# Patient Record
Sex: MALE | Race: BLACK OR AFRICAN AMERICAN | NOT HISPANIC OR LATINO | Employment: OTHER | URBAN - METROPOLITAN AREA
[De-identification: names, ages, dates, MRNs, and addresses within clinical notes are randomized per-mention and may not be internally consistent; named-entity substitution may affect disease eponyms.]

---

## 2018-11-20 ENCOUNTER — OFFICE VISIT (OUTPATIENT)
Dept: OPHTHALMOLOGY | Facility: CLINIC | Age: 68
End: 2018-11-20
Payer: MEDICARE

## 2018-11-20 DIAGNOSIS — H40.003 GLAUCOMA SUSPECT, BILATERAL: Primary | ICD-10-CM

## 2018-11-20 PROCEDURE — 92004 COMPRE OPH EXAM NEW PT 1/>: CPT | Mod: S$PBB,,, | Performed by: OPHTHALMOLOGY

## 2018-11-20 PROCEDURE — 99999 PR PBB SHADOW E&M-NEW PATIENT-LVL II: CPT | Mod: PBBFAC,,, | Performed by: OPHTHALMOLOGY

## 2018-11-20 PROCEDURE — 99202 OFFICE O/P NEW SF 15 MIN: CPT | Mod: PBBFAC,PO | Performed by: OPHTHALMOLOGY

## 2018-11-20 RX ORDER — LISINOPRIL 20 MG/1
20 TABLET ORAL DAILY
COMMUNITY

## 2018-11-20 RX ORDER — GLIPIZIDE 10 MG/1
5 TABLET, FILM COATED, EXTENDED RELEASE ORAL
COMMUNITY

## 2018-11-20 RX ORDER — ASPIRIN 81 MG/1
81 TABLET ORAL DAILY
COMMUNITY

## 2018-11-20 RX ORDER — METFORMIN HYDROCHLORIDE 1000 MG/1
1000 TABLET ORAL 2 TIMES DAILY WITH MEALS
COMMUNITY

## 2018-11-20 NOTE — PROGRESS NOTES
===============================  11/20/2018   Azalea Acosta,   68 y.o. male   Last visit [facility]: :Visit date not found   Last visit eye dept. Visit date not found  VA:  Uncorrected distance visual acuity was 20/25 -2 in the right eye and 20/25 +2 in the left eye.  Tonometry     Tonometry (Applanation, 2:01 PM)       Right Left    Pressure 26 25              Wearing Rx     Wearing Rx       Sphere    Right +1.50    Left +1.50    Type:  OTC readersl              Manifest Refraction     Manifest Refraction       Sphere Cylinder Dist VA    Right [place] Sphere 20/25-1    Left [place] Sphere 20/20              Chief Complaint   Patient presents with    Diabetic Eye Exam        HPI     Diabetic Eye exam  New Patient  Problem with distance VA worse at night  Wears +1.50 readers  No pain or discomfort  Ref: Olga Hernandez    DM 2003    Last edited by Mariana Bennett on 11/20/2018  1:47 PM. (History)          ________________  11/20/2018  Problem List Items Addressed This Visit     None        T 25 27  Cd 0.6 0.5  - fam hx no prev hx   gonio ok    do cct rnfl    rtc   iop chenck and VF       ===========================

## 2018-12-21 ENCOUNTER — OFFICE VISIT (OUTPATIENT)
Dept: OPHTHALMOLOGY | Facility: CLINIC | Age: 68
End: 2018-12-21
Payer: MEDICARE

## 2018-12-21 ENCOUNTER — APPOINTMENT (OUTPATIENT)
Dept: OPHTHALMOLOGY | Facility: CLINIC | Age: 68
End: 2018-12-21
Payer: MEDICARE

## 2018-12-21 DIAGNOSIS — H40.003 GLAUCOMA SUSPECT, BOTH EYES: Primary | ICD-10-CM

## 2018-12-21 PROCEDURE — 99213 OFFICE O/P EST LOW 20 MIN: CPT | Mod: S$PBB,,, | Performed by: OPHTHALMOLOGY

## 2018-12-21 PROCEDURE — 92083 EXTENDED VISUAL FIELD XM: CPT | Mod: PBBFAC,PO | Performed by: OPHTHALMOLOGY

## 2018-12-21 PROCEDURE — 99999 PR PBB SHADOW E&M-EST. PATIENT-LVL II: CPT | Mod: PBBFAC,,, | Performed by: OPHTHALMOLOGY

## 2018-12-21 PROCEDURE — 99212 OFFICE O/P EST SF 10 MIN: CPT | Mod: PBBFAC,PO,25 | Performed by: OPHTHALMOLOGY

## 2018-12-21 NOTE — PROGRESS NOTES
===============================  12/21/2018   Azalea Acosta,   68 y.o. male   Last visit [facility]: :11/20/2018   Last visit eye dept. 11/20/2018  VA:  Visual acuity was not recorded.   Not recorded         Not recorded         Not recorded        Chief Complaint   Patient presents with    s coag     here for review of diagnostics and iop ck        HPI     s coag      Additional comments: here for review of diagnostics and iop ck              Comments     DM 2003  No dbr  Suspect coag  Tmax 25/27  Cct 506/514  C/d 0.6/0.55  VF 12/21/18          Last edited by EDER Jeter on 12/21/2018  1:41 PM. (History)          ________________  12/21/2018  Problem List Items Addressed This Visit     None        Vf   od sup lid cut  Os incr bs   T 25  27  cct +3  Do sdp optos  rtc  1mo iop liekly  - xalatan      .       ===========================

## 2019-01-28 ENCOUNTER — OFFICE VISIT (OUTPATIENT)
Dept: OPHTHALMOLOGY | Facility: CLINIC | Age: 69
End: 2019-01-28
Payer: MEDICARE

## 2019-01-28 DIAGNOSIS — H40.1131 PRIMARY OPEN ANGLE GLAUCOMA (POAG) OF BOTH EYES, MILD STAGE: Primary | ICD-10-CM

## 2019-01-28 PROCEDURE — 92250 FUNDUS PHOTOGRAPHY W/I&R: CPT | Mod: PBBFAC,PN | Performed by: OPHTHALMOLOGY

## 2019-01-28 PROCEDURE — 99999 PR PBB SHADOW E&M-EST. PATIENT-LVL II: ICD-10-PCS | Mod: PBBFAC,,, | Performed by: OPHTHALMOLOGY

## 2019-01-28 PROCEDURE — 99213 PR OFFICE/OUTPT VISIT, EST, LEVL III, 20-29 MIN: ICD-10-PCS | Mod: S$PBB,,, | Performed by: OPHTHALMOLOGY

## 2019-01-28 PROCEDURE — 92250 COLOR FUNDUS PHOTOGRAPHY - OU - BOTH EYES: ICD-10-PCS | Mod: 26,S$PBB,, | Performed by: OPHTHALMOLOGY

## 2019-01-28 PROCEDURE — 99213 OFFICE O/P EST LOW 20 MIN: CPT | Mod: S$PBB,,, | Performed by: OPHTHALMOLOGY

## 2019-01-28 PROCEDURE — 99212 OFFICE O/P EST SF 10 MIN: CPT | Mod: PBBFAC,PN,25 | Performed by: OPHTHALMOLOGY

## 2019-01-28 PROCEDURE — 99999 PR PBB SHADOW E&M-EST. PATIENT-LVL II: CPT | Mod: PBBFAC,,, | Performed by: OPHTHALMOLOGY

## 2019-01-28 RX ORDER — LATANOPROST 50 UG/ML
1 SOLUTION/ DROPS OPHTHALMIC NIGHTLY
Qty: 1 BOTTLE | Refills: 4 | Status: SHIPPED | OUTPATIENT
Start: 2019-01-28 | End: 2019-07-11 | Stop reason: SDUPTHER

## 2019-01-28 NOTE — PROGRESS NOTES
===============================  01/28/2019   Azalea Acosta,   68 y.o. male   Last visit [facility]: :12/21/2018   Last visit eye dept. Visit date not found  VA:  Uncorrected distance visual acuity was 20/25 -1 in the right eye and 20/25 -1 in the left eye.  Tonometry     Tonometry (Applanation, 1:54 PM)       Right Left    Pressure 24 24               Not recorded         Not recorded        Chief Complaint   Patient presents with    Glaucoma Suspect     1 month IOP check, likely start Xalatan this visit. pt states no changes since his last visit. not using any drops. no pain in the eyes. vision is about the same.     Ophthalmic Medications     Ophthalmic-Intraocular Pressure Reducing Agents, Prostaglandin Analogs Start End    latanoprost 0.005 % ophthalmic solution 1/28/2019 1/28/2020    Sig: Place 1 drop into both eyes every evening.    Route: Both Eyes         HPI     Glaucoma Suspect      Additional comments: 1 month IOP check, likely start Xalatan this visit.   pt states no changes since his last visit. not using any drops. no pain in   the eyes. vision is about the same.              Comments     DM 2003  No dbr  Suspect coag  Tmax 25/27  Cct 506/514  C/d 0.6/0.55  VF 12/21/18          Last edited by Mayank Dallas on 1/28/2019  1:28 PM. (History)          ________________  1/28/2019  Problem List Items Addressed This Visit     None      Visit Diagnoses     Primary open angle glaucoma (POAG) of both eyes, mild stage    -  Primary    Relevant Orders    Color Fundus Photography - OU - Both Eyes (Completed)        iop 24 ou  Start latanoprost ou qhs  rtc 2 mos  .       ===========================

## 2019-03-25 ENCOUNTER — OFFICE VISIT (OUTPATIENT)
Dept: OPHTHALMOLOGY | Facility: CLINIC | Age: 69
End: 2019-03-25
Payer: MEDICARE

## 2019-03-25 DIAGNOSIS — H40.1131 PRIMARY OPEN ANGLE GLAUCOMA (POAG) OF BOTH EYES, MILD STAGE: Primary | ICD-10-CM

## 2019-03-25 PROCEDURE — 92012 PR EYE EXAM, EST PATIENT,INTERMED: ICD-10-PCS | Mod: S$PBB,,, | Performed by: OPHTHALMOLOGY

## 2019-03-25 PROCEDURE — 99999 PR PBB SHADOW E&M-EST. PATIENT-LVL II: ICD-10-PCS | Mod: PBBFAC,,, | Performed by: OPHTHALMOLOGY

## 2019-03-25 PROCEDURE — 99999 PR PBB SHADOW E&M-EST. PATIENT-LVL II: CPT | Mod: PBBFAC,,, | Performed by: OPHTHALMOLOGY

## 2019-03-25 PROCEDURE — 92012 INTRM OPH EXAM EST PATIENT: CPT | Mod: S$PBB,,, | Performed by: OPHTHALMOLOGY

## 2019-03-25 PROCEDURE — 99212 OFFICE O/P EST SF 10 MIN: CPT | Mod: PBBFAC,PN | Performed by: OPHTHALMOLOGY

## 2019-03-25 NOTE — PROGRESS NOTES
===============================  03/25/2019   Azalea Acosta,   69 y.o. male   Last visit [facility]: :1/28/2019   Last visit eye dept. 1/28/2019  VA:  Uncorrected distance visual acuity was 20/25 -1 in the right eye and 20/25 -2 in the left eye.  Tonometry     Tonometry (Applanation, 1:22 PM)       Right Left    Pressure 25 21               Not recorded         Not recorded        Chief Complaint   Patient presents with    Glaucoma     2 month IOP check, started Latanoprost last visit. pt states he's using his drops as directed. no ocular pain or irritation. vision is about the same.      Ophthalmic Medications     Ophthalmic-Intraocular Pressure Reducing Agents, Prostaglandin Analogs Start End     latanoprost 0.005 % ophthalmic solution    1/28/2019 1/28/2020    Sig: Place 1 drop into both eyes every evening.    Route: Both Eyes         HPI     Glaucoma      Additional comments: 2 month IOP check, started Latanoprost last visit.   pt states he's using his drops as directed. no ocular pain or irritation.   vision is about the same.               Comments     DM 2003  No dbr  Suspect coag  Tmax 25/27  Cct 506/514  C/d 0.6/0.55  VF 12/21/18    Latanoprost QHS OU          Last edited by Mayank Dallas on 3/25/2019  1:13 PM. (History)          ________________  3/25/2019  Problem List Items Addressed This Visit        Eye/Vision problems    Primary open angle glaucoma (POAG) of both eyes, mild stage - Primary          .iop not well controlled on xalatan  Pt. C/o cost of xalatan  Discussed SLT  rtc to eval with Dr. Irvin or Dr. Smith       ===========================

## 2019-04-04 ENCOUNTER — OFFICE VISIT (OUTPATIENT)
Dept: OPHTHALMOLOGY | Facility: CLINIC | Age: 69
End: 2019-04-04
Payer: MEDICARE

## 2019-04-04 DIAGNOSIS — H40.1131 PRIMARY OPEN ANGLE GLAUCOMA (POAG) OF BOTH EYES, MILD STAGE: Primary | ICD-10-CM

## 2019-04-04 PROCEDURE — 92012 INTRM OPH EXAM EST PATIENT: CPT | Mod: S$PBB,,, | Performed by: OPHTHALMOLOGY

## 2019-04-04 PROCEDURE — 99212 OFFICE O/P EST SF 10 MIN: CPT | Mod: PBBFAC,PN | Performed by: OPHTHALMOLOGY

## 2019-04-04 PROCEDURE — 99999 PR PBB SHADOW E&M-EST. PATIENT-LVL II: CPT | Mod: PBBFAC,,, | Performed by: OPHTHALMOLOGY

## 2019-04-04 PROCEDURE — 99999 PR PBB SHADOW E&M-EST. PATIENT-LVL II: ICD-10-PCS | Mod: PBBFAC,,, | Performed by: OPHTHALMOLOGY

## 2019-04-04 PROCEDURE — 92012 PR EYE EXAM, EST PATIENT,INTERMED: ICD-10-PCS | Mod: S$PBB,,, | Performed by: OPHTHALMOLOGY

## 2019-04-04 RX ORDER — KETOROLAC TROMETHAMINE 5 MG/ML
SOLUTION OPHTHALMIC
Qty: 1 BOTTLE | Refills: 0 | Status: SHIPPED | OUTPATIENT
Start: 2019-04-04 | End: 2019-05-20

## 2019-04-04 NOTE — PROGRESS NOTES
SUBJECTIVE:   Azalea Acosta is a 69 y.o. male   Uncorrected distance visual acuity was 20/20 -2 in the right eye and 20/20 -1 in the left eye.   Chief Complaint   Patient presents with    Glaucoma        HPI:  HPI     SLT/Glaucoma Eval ref from [facility]  No pain or discomfort  VA stable OU  100% compliant with Latanoprost    DM 2003  No dbr  Suspect coag  Tmax 25/27  Cct 506/514  C/d 0.6/0.55  VF 12/21/18    Latanoprost QHS OU    Last edited by Mariana Bennett on 4/4/2019  1:29 PM. (History)        Assessment /Plan :  1. Primary open angle glaucoma (POAG) of both eyes, mild stage IOP not within acceptable range relative to target IOP with risk of irreversible visual loss. Better IOP control is recommended. Discussed options, risks, and benefits of additional medication, SLT laser, and/or incisional glaucoma surgery. Reviewed importance of continued compliance with treatment and follow up.     Patient chooses schedule SLT  OD

## 2019-04-09 ENCOUNTER — TELEPHONE (OUTPATIENT)
Dept: OPHTHALMOLOGY | Facility: CLINIC | Age: 69
End: 2019-04-09

## 2019-04-09 NOTE — TELEPHONE ENCOUNTER
----- Message from Gilberto Stevenson sent at 4/9/2019  1:39 PM CDT -----  Contact: [phone]    Pt would like instructions on how to take eye medication before surgery.  Please call back at [phone].  x-

## 2019-05-20 ENCOUNTER — OFFICE VISIT (OUTPATIENT)
Dept: OPHTHALMOLOGY | Facility: CLINIC | Age: 69
End: 2019-05-20
Payer: MEDICARE

## 2019-05-20 DIAGNOSIS — H40.1131 PRIMARY OPEN ANGLE GLAUCOMA (POAG) OF BOTH EYES, MILD STAGE: Primary | ICD-10-CM

## 2019-05-20 PROCEDURE — 92012 INTRM OPH EXAM EST PATIENT: CPT | Mod: S$PBB,,, | Performed by: OPHTHALMOLOGY

## 2019-05-20 PROCEDURE — 99999 PR PBB SHADOW E&M-EST. PATIENT-LVL II: CPT | Mod: PBBFAC,,, | Performed by: OPHTHALMOLOGY

## 2019-05-20 PROCEDURE — 99999 PR PBB SHADOW E&M-EST. PATIENT-LVL II: ICD-10-PCS | Mod: PBBFAC,,, | Performed by: OPHTHALMOLOGY

## 2019-05-20 PROCEDURE — 99212 OFFICE O/P EST SF 10 MIN: CPT | Mod: PBBFAC | Performed by: OPHTHALMOLOGY

## 2019-05-20 PROCEDURE — 92012 PR EYE EXAM, EST PATIENT,INTERMED: ICD-10-PCS | Mod: S$PBB,,, | Performed by: OPHTHALMOLOGY

## 2019-05-20 RX ORDER — KETOROLAC TROMETHAMINE 5 MG/ML
SOLUTION OPHTHALMIC
Qty: 1 BOTTLE | Refills: 0 | Status: SHIPPED | OUTPATIENT
Start: 2019-05-20 | End: 2020-07-31

## 2019-05-20 NOTE — PROGRESS NOTES
SUBJECTIVE:   Azalea Acosta is a 69 y.o. male   Uncorrected distance visual acuity was 20/20 -1 in the right eye and 20/20 -2 in the left eye.   Chief Complaint   Patient presents with    Glaucoma     Latanoprost OU qhs        HPI:  HPI     Glaucoma      Additional comments: Latanoprost OU qhs              Comments     5 to 6 weeks post SLT OD (IOP check)    No eye pain and no eye changes since the laser  Patient states that the Latanoprost costs $50.    1. DM 2003 No dbr  2. Mild COAG (Tmax 25/27 ) (+fam hx)  Cct 506/514  Slt od 4-10-19 (24-)    Latanoprost QHS OU          Last edited by Becca Dailey on 5/20/2019  1:25 PM. (History)        Assessment /Plan :  1. Primary open angle glaucoma (POAG) of both eyes, mild stage   Nice response to the SLT OD  Doing well, IOP OD within acceptable range relative to target IOP and no evidence of progression. Continue current treatment. Reviewed importance of continued compliance with treatment and follow up.    IOP OS not within acceptable range relative to target IOP with risk of irreversible visual loss. Better IOP control is recommended. Discussed options, risks, and benefits of additional medication, SLT laser, and/or incisional glaucoma surgery. Reviewed importance of continued compliance with treatment and follow up.     Patient chooses schedule SLT  OS           Return for the SLT OS

## 2019-07-11 ENCOUNTER — OFFICE VISIT (OUTPATIENT)
Dept: OPHTHALMOLOGY | Facility: CLINIC | Age: 69
End: 2019-07-11
Payer: MEDICARE

## 2019-07-11 DIAGNOSIS — H40.1131 PRIMARY OPEN ANGLE GLAUCOMA (POAG) OF BOTH EYES, MILD STAGE: Primary | ICD-10-CM

## 2019-07-11 PROCEDURE — 92012 PR EYE EXAM, EST PATIENT,INTERMED: ICD-10-PCS | Mod: S$PBB,,, | Performed by: OPHTHALMOLOGY

## 2019-07-11 PROCEDURE — 92012 INTRM OPH EXAM EST PATIENT: CPT | Mod: S$PBB,,, | Performed by: OPHTHALMOLOGY

## 2019-07-11 PROCEDURE — 99999 PR PBB SHADOW E&M-EST. PATIENT-LVL II: ICD-10-PCS | Mod: PBBFAC,,, | Performed by: OPHTHALMOLOGY

## 2019-07-11 PROCEDURE — 99999 PR PBB SHADOW E&M-EST. PATIENT-LVL II: CPT | Mod: PBBFAC,,, | Performed by: OPHTHALMOLOGY

## 2019-07-11 PROCEDURE — 99212 OFFICE O/P EST SF 10 MIN: CPT | Mod: PBBFAC | Performed by: OPHTHALMOLOGY

## 2019-07-11 RX ORDER — LATANOPROST 50 UG/ML
1 SOLUTION/ DROPS OPHTHALMIC NIGHTLY
Qty: 1 BOTTLE | Refills: 12 | Status: SHIPPED | OUTPATIENT
Start: 2019-07-11 | End: 2020-08-24

## 2019-07-11 NOTE — PROGRESS NOTES
SUBJECTIVE:   Azalea Acosta is a 69 y.o. male   Uncorrected distance visual acuity was 20/20 in the right eye and 20/20 in the left eye.   Chief Complaint   Patient presents with    Glaucoma     5-6 Weeks SLT OS        HPI:  HPI     Glaucoma      Additional comments: 5-6 Weeks SLT OS              Comments     Patient states no visual complaints & no discomfort. 100% drop compliance       1. DM 2003 No dbr  2. Mild COAG (Tmax 25/27 ) (+fam hx)  Cct 506/514  Slt od 4-10-19 (24-21)  SLT OS 6-5-19    Latanoprost QHS OU          Last edited by Devora Patton on 7/11/2019  1:40 PM. (History)        Assessment /Plan :  1. Primary open angle glaucoma (POAG) of both eyes, mild stage S/P SLT OS 22-20    IOP elevated to to pt missing Latanoprost last night instructed pt to resume          Return to clinic in 3 months  or as needed.  With Dilation, HVF 24-2 and SDP

## 2019-10-24 ENCOUNTER — OFFICE VISIT (OUTPATIENT)
Dept: OPHTHALMOLOGY | Facility: CLINIC | Age: 69
End: 2019-10-24
Payer: MEDICARE

## 2019-10-24 ENCOUNTER — APPOINTMENT (OUTPATIENT)
Dept: OPHTHALMOLOGY | Facility: CLINIC | Age: 69
End: 2019-10-24
Payer: MEDICARE

## 2019-10-24 DIAGNOSIS — H25.13 NUCLEAR SCLEROSIS OF BOTH EYES: ICD-10-CM

## 2019-10-24 DIAGNOSIS — H40.1131 PRIMARY OPEN ANGLE GLAUCOMA (POAG) OF BOTH EYES, MILD STAGE: Primary | ICD-10-CM

## 2019-10-24 DIAGNOSIS — H26.9 CORTICAL CATARACT OF BOTH EYES: ICD-10-CM

## 2019-10-24 DIAGNOSIS — E11.9 DIABETES MELLITUS TYPE 2 WITHOUT RETINOPATHY: ICD-10-CM

## 2019-10-24 PROCEDURE — 92083 HUMPHREY VISUAL FIELD - OU - BOTH EYES: ICD-10-PCS | Mod: 26,S$PBB,, | Performed by: OPHTHALMOLOGY

## 2019-10-24 PROCEDURE — 92250 COLOR FUNDUS PHOTOGRAPHY - OU - BOTH EYES: ICD-10-PCS | Mod: 26,S$PBB,, | Performed by: OPHTHALMOLOGY

## 2019-10-24 PROCEDURE — 99999 PR PBB SHADOW E&M-EST. PATIENT-LVL II: CPT | Mod: PBBFAC,,, | Performed by: OPHTHALMOLOGY

## 2019-10-24 PROCEDURE — 92250 FUNDUS PHOTOGRAPHY W/I&R: CPT | Mod: PBBFAC | Performed by: OPHTHALMOLOGY

## 2019-10-24 PROCEDURE — 92014 COMPRE OPH EXAM EST PT 1/>: CPT | Mod: S$PBB,,, | Performed by: OPHTHALMOLOGY

## 2019-10-24 PROCEDURE — 99212 OFFICE O/P EST SF 10 MIN: CPT | Mod: PBBFAC,25 | Performed by: OPHTHALMOLOGY

## 2019-10-24 PROCEDURE — 92014 PR EYE EXAM, EST PATIENT,COMPREHESV: ICD-10-PCS | Mod: S$PBB,,, | Performed by: OPHTHALMOLOGY

## 2019-10-24 PROCEDURE — 92083 EXTENDED VISUAL FIELD XM: CPT | Mod: PBBFAC | Performed by: OPHTHALMOLOGY

## 2019-10-24 PROCEDURE — 99999 PR PBB SHADOW E&M-EST. PATIENT-LVL II: ICD-10-PCS | Mod: PBBFAC,,, | Performed by: OPHTHALMOLOGY

## 2019-10-24 NOTE — PROGRESS NOTES
SUBJECTIVE:   Azalea Acosta is a 69 y.o. male   Uncorrected distance visual acuity was 20/20 -2 in the right eye and 20/25 in the left eye.   Chief Complaint   Patient presents with    Glaucoma     3M HVF, DOA, and SDP        HPI:  HPI     Glaucoma      Additional comments: 3M HVF, DOA, and SDP              Comments     The patient states his eyes are doing well and he denies any ocular pain   at this time.  100% drop compliance    1. DM 2003 No dbr  2. Mild COAG (Tmax 25/27 ) (+fam hx)  Cct 506/514  Slt od 4-10-19 (24-21)  SLT OS 6-5-19    Latanoprost QHS OU          Last edited by Yuki Corbett on 10/24/2019  1:04 PM. (History)        Assessment /Plan :  1. Primary open angle glaucoma (POAG) of both eyes, mild stage   IOP OD not within acceptable range relative to target IOP with risk of irreversible visual loss. Better IOP control is recommended. Discussed options, risks, and benefits of additional medication, SLT laser, and/or incisional glaucoma surgery. Reviewed importance of continued compliance with treatment and follow up.     Patient chooses defer and recheck IOP next visit     Doing well, IOP OS within acceptable range relative to target IOP and no evidence of progression. Continue current treatment. Reviewed importance of continued compliance with treatment and follow up.       2. Nuclear sclerosis of both eyes monitor for now   3. Cortical cataract of both eyes monitor for now   4.      Diabetes Mellitus  No diabetic retinopathy at this time. Reviewed diabetic eye precautions including avoiding tobacco use,            Good glucose control, and importance of regular follow up.     Return to clinic in 3-4 months  or as needed.  With IOP Check and GOCT

## 2020-01-24 ENCOUNTER — OFFICE VISIT (OUTPATIENT)
Dept: OPHTHALMOLOGY | Facility: CLINIC | Age: 70
End: 2020-01-24
Payer: MEDICARE

## 2020-01-24 DIAGNOSIS — H40.1131 PRIMARY OPEN ANGLE GLAUCOMA (POAG) OF BOTH EYES, MILD STAGE: Primary | ICD-10-CM

## 2020-01-24 DIAGNOSIS — H25.13 NUCLEAR SCLEROSIS OF BOTH EYES: ICD-10-CM

## 2020-01-24 DIAGNOSIS — H26.9 CORTICAL CATARACT OF BOTH EYES: ICD-10-CM

## 2020-01-24 PROCEDURE — 92012 PR EYE EXAM, EST PATIENT,INTERMED: ICD-10-PCS | Mod: S$PBB,,, | Performed by: OPHTHALMOLOGY

## 2020-01-24 PROCEDURE — 99999 PR PBB SHADOW E&M-EST. PATIENT-LVL II: CPT | Mod: PBBFAC,,, | Performed by: OPHTHALMOLOGY

## 2020-01-24 PROCEDURE — 92012 INTRM OPH EXAM EST PATIENT: CPT | Mod: S$PBB,,, | Performed by: OPHTHALMOLOGY

## 2020-01-24 PROCEDURE — 99212 OFFICE O/P EST SF 10 MIN: CPT | Mod: PBBFAC | Performed by: OPHTHALMOLOGY

## 2020-01-24 PROCEDURE — 92133 CPTRZD OPH DX IMG PST SGM ON: CPT | Mod: PBBFAC | Performed by: OPHTHALMOLOGY

## 2020-01-24 PROCEDURE — 99999 PR PBB SHADOW E&M-EST. PATIENT-LVL II: ICD-10-PCS | Mod: PBBFAC,,, | Performed by: OPHTHALMOLOGY

## 2020-01-24 PROCEDURE — 92133 POSTERIOR SEGMENT OCT OPTIC NERVE(OCULAR COHERENCE TOMOGRAPHY) - OU - BOTH EYES: ICD-10-PCS | Mod: 26,S$PBB,, | Performed by: OPHTHALMOLOGY

## 2020-01-24 NOTE — PROGRESS NOTES
SUBJECTIVE  Azalea Acosta is 69 y.o. male  Uncorrected distance visual acuity was 20/25 in the right eye and 20/25 in the left eye.   Chief Complaint   Patient presents with    Glaucoma     3M GOCT and IOP check          HPI     Glaucoma      Additional comments: 3M GOCT and IOP check              Comments     The patient states his eyes are feeling fine and he denies any ocular   pain at this time.    1. DM 2003 No dbr  2. Mild COAG (Tmax 25/27 ) (+fam hx) goal = 21  Cct 506/514  Slt od 4-10-19 (24-21)  SLT OS 6-5-19 (22-20)  3. Mild Cataracts    Latanoprost QHS OU          Last edited by Yuki Corbett on 1/24/2020 10:25 AM. (History)         Assessment /Plan :  1. Primary open angle glaucoma (POAG) of both eyes, mild stage Doing well, IOP within acceptable range relative to target IOP and no evidence of progression. Continue current treatment. Reviewed importance of continued compliance with treatment and follow up.     2. Nuclear sclerosis of both eyes monitor for now   3. Cortical cataract of both eyes monitor for now     Return to clinic in 4 months  or as needed.  With IOP Check

## 2020-06-26 ENCOUNTER — OFFICE VISIT (OUTPATIENT)
Dept: OPHTHALMOLOGY | Facility: CLINIC | Age: 70
End: 2020-06-26
Payer: MEDICARE

## 2020-06-26 DIAGNOSIS — H26.9 CORTICAL CATARACT OF BOTH EYES: ICD-10-CM

## 2020-06-26 DIAGNOSIS — E11.9 DIABETES MELLITUS TYPE 2 WITHOUT RETINOPATHY: ICD-10-CM

## 2020-06-26 DIAGNOSIS — H40.1131 PRIMARY OPEN ANGLE GLAUCOMA (POAG) OF BOTH EYES, MILD STAGE: Primary | ICD-10-CM

## 2020-06-26 DIAGNOSIS — H25.13 NUCLEAR SCLEROSIS OF BOTH EYES: ICD-10-CM

## 2020-06-26 PROCEDURE — 92012 INTRM OPH EXAM EST PATIENT: CPT | Mod: S$PBB,,, | Performed by: OPHTHALMOLOGY

## 2020-06-26 PROCEDURE — 99999 PR PBB SHADOW E&M-EST. PATIENT-LVL III: ICD-10-PCS | Mod: PBBFAC,,, | Performed by: OPHTHALMOLOGY

## 2020-06-26 PROCEDURE — 92012 PR EYE EXAM, EST PATIENT,INTERMED: ICD-10-PCS | Mod: S$PBB,,, | Performed by: OPHTHALMOLOGY

## 2020-06-26 PROCEDURE — 99999 PR PBB SHADOW E&M-EST. PATIENT-LVL III: CPT | Mod: PBBFAC,,, | Performed by: OPHTHALMOLOGY

## 2020-06-26 PROCEDURE — 99213 OFFICE O/P EST LOW 20 MIN: CPT | Mod: PBBFAC | Performed by: OPHTHALMOLOGY

## 2020-06-26 RX ORDER — TIMOLOL MALEATE 5 MG/ML
1 SOLUTION/ DROPS OPHTHALMIC EVERY MORNING
Qty: 5 ML | Refills: 12 | Status: SHIPPED | OUTPATIENT
Start: 2020-06-26 | End: 2020-07-31

## 2020-06-26 NOTE — PROGRESS NOTES
SUBJECTIVE  Azalea Acosta is 70 y.o. male  Uncorrected distance visual acuity was 20/25 in the right eye and 20/30 in the left eye.   Chief Complaint   Patient presents with    Glaucoma          HPI     Pt here for 4m IOP chk. No pain or discomfort. VA stable. 100% compliant   with gtts.     1. DM 2003 No dbr  2. Mild COAG (Tmax 25/27 ) (+fam hx) goal = 21  Cct 506/514  SLT OD 4-10-19 (24-21)  SLT OS 6-5-19 (22-20)  3. Mild Cataracts    Latanoprost QHS OU    Last edited by Otf Nelson, Patient Care Assistant on 6/26/2020 11:09   AM. (History)         Assessment /Plan :  1. Primary open angle glaucoma (POAG) of both eyes, mild stage IOP not within acceptable range relative to target IOP with risk of irreversible visual loss. Better IOP control is recommended. Discussed options, risks, and benefits of additional medication, SLT laser, and/or incisional glaucoma surgery. Reviewed importance of continued compliance with treatment and follow up.     Patient chooses to add Timolol qam OU  Continue Latanoprost OU qhs   2. Nuclear sclerosis of both eyes monitor for now   3. Cortical cataract of both eyes monitor for now   4. Diabetes mellitus type 2 without retinopathy monitor for now         Return to clinic in 1 month  or as needed.  With IOP Check and GOCT

## 2020-07-31 ENCOUNTER — OFFICE VISIT (OUTPATIENT)
Dept: OPHTHALMOLOGY | Facility: CLINIC | Age: 70
End: 2020-07-31
Payer: MEDICARE

## 2020-07-31 DIAGNOSIS — H40.1131 PRIMARY OPEN ANGLE GLAUCOMA (POAG) OF BOTH EYES, MILD STAGE: Primary | ICD-10-CM

## 2020-07-31 DIAGNOSIS — E11.9 DIABETES MELLITUS TYPE 2 WITHOUT RETINOPATHY: ICD-10-CM

## 2020-07-31 DIAGNOSIS — H25.13 NUCLEAR SCLEROSIS OF BOTH EYES: ICD-10-CM

## 2020-07-31 PROCEDURE — 99999 PR PBB SHADOW E&M-EST. PATIENT-LVL II: ICD-10-PCS | Mod: PBBFAC,,, | Performed by: OPHTHALMOLOGY

## 2020-07-31 PROCEDURE — 92012 INTRM OPH EXAM EST PATIENT: CPT | Mod: S$PBB,,, | Performed by: OPHTHALMOLOGY

## 2020-07-31 PROCEDURE — 92133 CPTRZD OPH DX IMG PST SGM ON: CPT | Mod: PBBFAC | Performed by: OPHTHALMOLOGY

## 2020-07-31 PROCEDURE — 92133 POSTERIOR SEGMENT OCT OPTIC NERVE(OCULAR COHERENCE TOMOGRAPHY) - OU - BOTH EYES: ICD-10-PCS | Mod: 26,S$PBB,, | Performed by: OPHTHALMOLOGY

## 2020-07-31 PROCEDURE — 99212 OFFICE O/P EST SF 10 MIN: CPT | Mod: PBBFAC,25 | Performed by: OPHTHALMOLOGY

## 2020-07-31 PROCEDURE — 99999 PR PBB SHADOW E&M-EST. PATIENT-LVL II: CPT | Mod: PBBFAC,,, | Performed by: OPHTHALMOLOGY

## 2020-07-31 PROCEDURE — 92012 PR EYE EXAM, EST PATIENT,INTERMED: ICD-10-PCS | Mod: S$PBB,,, | Performed by: OPHTHALMOLOGY

## 2020-07-31 RX ORDER — SULFAMETHOXAZOLE AND TRIMETHOPRIM 400; 80 MG/1; MG/1
TABLET ORAL
COMMUNITY
Start: 2020-06-30 | End: 2021-12-22 | Stop reason: SDUPTHER

## 2020-07-31 NOTE — PROGRESS NOTES
SUBJECTIVE  Azalea Acosta is 70 y.o. male  Uncorrected distance visual acuity was 20/20 in the right eye and 20/30 in the left eye.   Chief Complaint   Patient presents with    Glaucoma     1 month iop check/ goct          HPI     Glaucoma      Additional comments: 1 month iop check/ goct              Comments     1. DM 2003 No dbr  2. Mild COAG (Tmax 25/27 ) (+fam hx) goal = 21  Cct 506/514  SLT OD 4-10-19 (24-21)  SLT OS 6-5-19 (22-20)  3. Mild Cataracts    Latanoprost QHS OU          Last edited by Renetta Harvey on 7/31/2020 10:18 AM. (History)         Assessment /Plan :  1. Primary open angle glaucoma (POAG) of both eyes, mild stage Doing well, IOP within acceptable range relative to target IOP and no evidence of progression. Continue current treatment. Reviewed importance of continued compliance with treatment and follow up.     2. Nuclear sclerosis of both eyes Patient does reports mild visual decline from incipient cataractous changes, but not sufficient to affect activities of daily living. I recommend monitoring visual status and follow up when visual symptoms worsen.     3. Diabetes mellitus type 2 without retinopathy          Return to clinic in 3-4 months  or as needed.  With Dilation, HVF 24-2 and SDP

## 2020-11-20 ENCOUNTER — OFFICE VISIT (OUTPATIENT)
Dept: OPHTHALMOLOGY | Facility: CLINIC | Age: 70
End: 2020-11-20
Payer: MEDICARE

## 2020-11-20 ENCOUNTER — APPOINTMENT (OUTPATIENT)
Dept: OPHTHALMOLOGY | Facility: CLINIC | Age: 70
End: 2020-11-20
Payer: MEDICARE

## 2020-11-20 DIAGNOSIS — H26.9 CORTICAL CATARACT OF BOTH EYES: ICD-10-CM

## 2020-11-20 DIAGNOSIS — E11.9 DIABETES MELLITUS TYPE 2 WITHOUT RETINOPATHY: ICD-10-CM

## 2020-11-20 DIAGNOSIS — H25.13 NUCLEAR SCLEROSIS OF BOTH EYES: ICD-10-CM

## 2020-11-20 DIAGNOSIS — H40.1131 PRIMARY OPEN ANGLE GLAUCOMA (POAG) OF BOTH EYES, MILD STAGE: Primary | ICD-10-CM

## 2020-11-20 PROCEDURE — 92133 CPTRZD OPH DX IMG PST SGM ON: CPT | Mod: PBBFAC | Performed by: OPHTHALMOLOGY

## 2020-11-20 PROCEDURE — 99212 OFFICE O/P EST SF 10 MIN: CPT | Mod: PBBFAC,25 | Performed by: OPHTHALMOLOGY

## 2020-11-20 PROCEDURE — 99999 PR PBB SHADOW E&M-EST. PATIENT-LVL II: ICD-10-PCS | Mod: PBBFAC,,, | Performed by: OPHTHALMOLOGY

## 2020-11-20 PROCEDURE — 92083 EXTENDED VISUAL FIELD XM: CPT | Mod: PBBFAC | Performed by: OPHTHALMOLOGY

## 2020-11-20 PROCEDURE — 92014 PR EYE EXAM, EST PATIENT,COMPREHESV: ICD-10-PCS | Mod: S$PBB,,, | Performed by: OPHTHALMOLOGY

## 2020-11-20 PROCEDURE — 99999 PR PBB SHADOW E&M-EST. PATIENT-LVL II: CPT | Mod: PBBFAC,,, | Performed by: OPHTHALMOLOGY

## 2020-11-20 PROCEDURE — 92014 COMPRE OPH EXAM EST PT 1/>: CPT | Mod: S$PBB,,, | Performed by: OPHTHALMOLOGY

## 2020-11-20 PROCEDURE — 92133 POSTERIOR SEGMENT OCT OPTIC NERVE(OCULAR COHERENCE TOMOGRAPHY) - OU - BOTH EYES: ICD-10-PCS | Mod: 26,S$PBB,, | Performed by: OPHTHALMOLOGY

## 2020-11-20 PROCEDURE — 92083 HUMPHREY VISUAL FIELD - OU - BOTH EYES: ICD-10-PCS | Mod: 26,S$PBB,, | Performed by: OPHTHALMOLOGY

## 2020-11-20 RX ORDER — TIMOLOL MALEATE 5 MG/ML
SOLUTION/ DROPS OPHTHALMIC
COMMUNITY
Start: 2020-10-29 | End: 2021-04-08 | Stop reason: SDUPTHER

## 2020-11-20 RX ORDER — AMLODIPINE AND VALSARTAN 10; 320 MG/1; MG/1
1 TABLET ORAL DAILY
COMMUNITY
Start: 2020-10-19

## 2020-11-20 RX ORDER — METOPROLOL SUCCINATE 100 MG/1
100 TABLET, EXTENDED RELEASE ORAL DAILY
COMMUNITY
Start: 2020-10-19

## 2021-04-05 DIAGNOSIS — H40.1131 PRIMARY OPEN ANGLE GLAUCOMA (POAG) OF BOTH EYES, MILD STAGE: ICD-10-CM

## 2021-04-05 RX ORDER — LATANOPROST 50 UG/ML
SOLUTION/ DROPS OPHTHALMIC
Qty: 3 ML | Refills: 0 | Status: SHIPPED | OUTPATIENT
Start: 2021-04-05 | End: 2021-04-05 | Stop reason: SDUPTHER

## 2021-04-05 RX ORDER — LATANOPROST 50 UG/ML
SOLUTION/ DROPS OPHTHALMIC
Qty: 3 ML | Refills: 0 | Status: SHIPPED | OUTPATIENT
Start: 2021-04-05 | End: 2021-04-08 | Stop reason: SDUPTHER

## 2021-04-08 ENCOUNTER — OFFICE VISIT (OUTPATIENT)
Dept: OPHTHALMOLOGY | Facility: CLINIC | Age: 71
End: 2021-04-08
Payer: MEDICARE

## 2021-04-08 DIAGNOSIS — H26.9 CORTICAL CATARACT OF BOTH EYES: ICD-10-CM

## 2021-04-08 DIAGNOSIS — E11.9 DIABETES MELLITUS TYPE 2 WITHOUT RETINOPATHY: ICD-10-CM

## 2021-04-08 DIAGNOSIS — H40.1131 PRIMARY OPEN ANGLE GLAUCOMA (POAG) OF BOTH EYES, MILD STAGE: Primary | ICD-10-CM

## 2021-04-08 DIAGNOSIS — H25.13 NUCLEAR SCLEROSIS OF BOTH EYES: ICD-10-CM

## 2021-04-08 PROCEDURE — 99999 PR PBB SHADOW E&M-EST. PATIENT-LVL II: CPT | Mod: PBBFAC,,, | Performed by: OPHTHALMOLOGY

## 2021-04-08 PROCEDURE — 99214 PR OFFICE/OUTPT VISIT, EST, LEVL IV, 30-39 MIN: ICD-10-PCS | Mod: S$PBB,,, | Performed by: OPHTHALMOLOGY

## 2021-04-08 PROCEDURE — 99212 OFFICE O/P EST SF 10 MIN: CPT | Mod: PBBFAC | Performed by: OPHTHALMOLOGY

## 2021-04-08 PROCEDURE — 99214 OFFICE O/P EST MOD 30 MIN: CPT | Mod: S$PBB,,, | Performed by: OPHTHALMOLOGY

## 2021-04-08 PROCEDURE — 99999 PR PBB SHADOW E&M-EST. PATIENT-LVL II: ICD-10-PCS | Mod: PBBFAC,,, | Performed by: OPHTHALMOLOGY

## 2021-04-08 RX ORDER — LATANOPROST 50 UG/ML
SOLUTION/ DROPS OPHTHALMIC
Qty: 3 ML | Refills: 0 | Status: SHIPPED | OUTPATIENT
Start: 2021-04-08 | End: 2021-05-17 | Stop reason: SDUPTHER

## 2021-04-08 RX ORDER — TIMOLOL MALEATE 5 MG/ML
SOLUTION/ DROPS OPHTHALMIC
Qty: 10 ML | Refills: 6 | Status: SHIPPED | OUTPATIENT
Start: 2021-04-08 | End: 2021-05-17 | Stop reason: SDUPTHER

## 2021-05-17 ENCOUNTER — TELEPHONE (OUTPATIENT)
Dept: OPHTHALMOLOGY | Facility: CLINIC | Age: 71
End: 2021-05-17

## 2021-05-17 DIAGNOSIS — H40.1131 PRIMARY OPEN ANGLE GLAUCOMA (POAG) OF BOTH EYES, MILD STAGE: ICD-10-CM

## 2021-05-17 RX ORDER — LATANOPROST 50 UG/ML
1 SOLUTION/ DROPS OPHTHALMIC NIGHTLY
Qty: 3 ML | Refills: 12 | Status: SHIPPED | OUTPATIENT
Start: 2021-05-17 | End: 2021-06-28

## 2021-05-17 RX ORDER — TIMOLOL MALEATE 5 MG/ML
1 SOLUTION/ DROPS OPHTHALMIC EVERY MORNING
Qty: 5 ML | Refills: 12 | Status: SHIPPED | OUTPATIENT
Start: 2021-05-17 | End: 2022-06-20

## 2021-10-19 ENCOUNTER — OFFICE VISIT (OUTPATIENT)
Dept: OPHTHALMOLOGY | Facility: CLINIC | Age: 71
End: 2021-10-19
Payer: MEDICARE

## 2021-10-19 DIAGNOSIS — H25.13 NUCLEAR SCLEROSIS OF BOTH EYES: ICD-10-CM

## 2021-10-19 DIAGNOSIS — H40.1131 PRIMARY OPEN ANGLE GLAUCOMA (POAG) OF BOTH EYES, MILD STAGE: Primary | ICD-10-CM

## 2021-10-19 DIAGNOSIS — H26.9 CORTICAL CATARACT OF BOTH EYES: ICD-10-CM

## 2021-10-19 PROCEDURE — 99214 PR OFFICE/OUTPT VISIT, EST, LEVL IV, 30-39 MIN: ICD-10-PCS | Mod: S$PBB,,, | Performed by: OPHTHALMOLOGY

## 2021-10-19 PROCEDURE — 99214 OFFICE O/P EST MOD 30 MIN: CPT | Mod: S$PBB,,, | Performed by: OPHTHALMOLOGY

## 2021-10-19 PROCEDURE — 99211 OFF/OP EST MAY X REQ PHY/QHP: CPT | Mod: PBBFAC | Performed by: OPHTHALMOLOGY

## 2021-10-19 PROCEDURE — 99999 PR PBB SHADOW E&M-EST. PATIENT-LVL I: CPT | Mod: PBBFAC,,, | Performed by: OPHTHALMOLOGY

## 2021-10-19 PROCEDURE — 99999 PR PBB SHADOW E&M-EST. PATIENT-LVL I: ICD-10-PCS | Mod: PBBFAC,,, | Performed by: OPHTHALMOLOGY

## 2021-10-28 DIAGNOSIS — H40.1131 PRIMARY OPEN ANGLE GLAUCOMA (POAG) OF BOTH EYES, MILD STAGE: ICD-10-CM

## 2021-10-28 RX ORDER — LATANOPROST 50 UG/ML
SOLUTION/ DROPS OPHTHALMIC
Qty: 2.5 ML | Refills: 11 | Status: SHIPPED | OUTPATIENT
Start: 2021-10-28 | End: 2022-06-20 | Stop reason: SDUPTHER

## 2021-12-22 ENCOUNTER — OFFICE VISIT (OUTPATIENT)
Dept: OPHTHALMOLOGY | Facility: CLINIC | Age: 71
End: 2021-12-22
Payer: MEDICARE

## 2021-12-22 DIAGNOSIS — H10.9 BACTERIAL CONJUNCTIVITIS: Primary | ICD-10-CM

## 2021-12-22 DIAGNOSIS — H25.13 NUCLEAR SCLEROSIS OF BOTH EYES: ICD-10-CM

## 2021-12-22 DIAGNOSIS — L03.211 CELLULITIS OF FACE: ICD-10-CM

## 2021-12-22 DIAGNOSIS — H40.1131 PRIMARY OPEN ANGLE GLAUCOMA (POAG) OF BOTH EYES, MILD STAGE: ICD-10-CM

## 2021-12-22 PROCEDURE — 99214 PR OFFICE/OUTPT VISIT, EST, LEVL IV, 30-39 MIN: ICD-10-PCS | Mod: S$PBB,,, | Performed by: OPHTHALMOLOGY

## 2021-12-22 PROCEDURE — 99212 OFFICE O/P EST SF 10 MIN: CPT | Mod: PBBFAC | Performed by: OPHTHALMOLOGY

## 2021-12-22 PROCEDURE — 87070 CULTURE OTHR SPECIMN AEROBIC: CPT | Performed by: OPHTHALMOLOGY

## 2021-12-22 PROCEDURE — 99999 PR PBB SHADOW E&M-EST. PATIENT-LVL II: ICD-10-PCS | Mod: PBBFAC,,, | Performed by: OPHTHALMOLOGY

## 2021-12-22 PROCEDURE — 87186 SC STD MICRODIL/AGAR DIL: CPT | Performed by: OPHTHALMOLOGY

## 2021-12-22 PROCEDURE — 87077 CULTURE AEROBIC IDENTIFY: CPT | Performed by: OPHTHALMOLOGY

## 2021-12-22 PROCEDURE — 99214 OFFICE O/P EST MOD 30 MIN: CPT | Mod: S$PBB,,, | Performed by: OPHTHALMOLOGY

## 2021-12-22 PROCEDURE — 99999 PR PBB SHADOW E&M-EST. PATIENT-LVL II: CPT | Mod: PBBFAC,,, | Performed by: OPHTHALMOLOGY

## 2021-12-22 RX ORDER — SULFAMETHOXAZOLE AND TRIMETHOPRIM 400; 80 MG/1; MG/1
2 TABLET ORAL 2 TIMES DAILY
Qty: 20 TABLET | Refills: 1 | Status: SHIPPED | OUTPATIENT
Start: 2021-12-22 | End: 2022-04-04

## 2021-12-22 RX ORDER — POLYMYXIN B SULFATE AND TRIMETHOPRIM 1; 10000 MG/ML; [USP'U]/ML
1 SOLUTION OPHTHALMIC 4 TIMES DAILY
Qty: 10 ML | Refills: 1 | Status: SHIPPED | OUTPATIENT
Start: 2021-12-22 | End: 2022-04-04

## 2021-12-27 LAB — BACTERIA SPEC AEROBE CULT: ABNORMAL

## 2022-02-04 ENCOUNTER — TELEPHONE (OUTPATIENT)
Dept: OPHTHALMOLOGY | Facility: CLINIC | Age: 72
End: 2022-02-04
Payer: MEDICARE

## 2022-02-04 NOTE — TELEPHONE ENCOUNTER
Spoke with Dr Rios and he advised patient start refill of Bactrim per previous instructions. If he starts to have mucus discharge, pain, double va he is to contact our office ASAP      ----- Message from Nell Acosta sent at 2/4/2022  2:27 PM CST -----  [phone] pt would like to be advised Bacterial conjunctivitis

## 2022-02-09 ENCOUNTER — TELEPHONE (OUTPATIENT)
Dept: OPHTHALMOLOGY | Facility: CLINIC | Age: 72
End: 2022-02-09
Payer: MEDICARE

## 2022-02-09 NOTE — TELEPHONE ENCOUNTER
I rescheduled the patient's appointment. I left a message for the patient to call me to see if he needed a sooner appointment.

## 2022-04-04 ENCOUNTER — OFFICE VISIT (OUTPATIENT)
Dept: OPHTHALMOLOGY | Facility: CLINIC | Age: 72
End: 2022-04-04
Payer: MEDICARE

## 2022-04-04 ENCOUNTER — APPOINTMENT (OUTPATIENT)
Dept: OPHTHALMOLOGY | Facility: CLINIC | Age: 72
End: 2022-04-04
Payer: MEDICARE

## 2022-04-04 DIAGNOSIS — L03.211 CELLULITIS OF FACE: ICD-10-CM

## 2022-04-04 DIAGNOSIS — H25.13 NUCLEAR SCLEROSIS OF BOTH EYES: ICD-10-CM

## 2022-04-04 DIAGNOSIS — H40.1131 PRIMARY OPEN ANGLE GLAUCOMA (POAG) OF BOTH EYES, MILD STAGE: Primary | ICD-10-CM

## 2022-04-04 DIAGNOSIS — H26.9 CORTICAL CATARACT OF BOTH EYES: ICD-10-CM

## 2022-04-04 PROCEDURE — 99212 OFFICE O/P EST SF 10 MIN: CPT | Mod: PBBFAC | Performed by: OPHTHALMOLOGY

## 2022-04-04 PROCEDURE — 99214 PR OFFICE/OUTPT VISIT, EST, LEVL IV, 30-39 MIN: ICD-10-PCS | Mod: S$PBB,,, | Performed by: OPHTHALMOLOGY

## 2022-04-04 PROCEDURE — 99999 PR PBB SHADOW E&M-EST. PATIENT-LVL II: CPT | Mod: PBBFAC,,, | Performed by: OPHTHALMOLOGY

## 2022-04-04 PROCEDURE — 99999 PR PBB SHADOW E&M-EST. PATIENT-LVL II: ICD-10-PCS | Mod: PBBFAC,,, | Performed by: OPHTHALMOLOGY

## 2022-04-04 PROCEDURE — 92083 EXTENDED VISUAL FIELD XM: CPT | Mod: PBBFAC | Performed by: OPHTHALMOLOGY

## 2022-04-04 PROCEDURE — 99214 OFFICE O/P EST MOD 30 MIN: CPT | Mod: S$PBB,,, | Performed by: OPHTHALMOLOGY

## 2022-04-04 PROCEDURE — 92083 HUMPHREY VISUAL FIELD - OU - BOTH EYES: ICD-10-PCS | Mod: 26,S$PBB,, | Performed by: OPHTHALMOLOGY

## 2022-04-04 RX ORDER — ERYTHROMYCIN 5 MG/G
OINTMENT OPHTHALMIC
Qty: 3.5 G | Refills: 0 | Status: SHIPPED | OUTPATIENT
Start: 2022-04-04

## 2022-04-04 RX ORDER — SULFAMETHOXAZOLE AND TRIMETHOPRIM 800; 160 MG/1; MG/1
1 TABLET ORAL 2 TIMES DAILY
Qty: 20 TABLET | Refills: 0 | Status: SHIPPED | OUTPATIENT
Start: 2022-04-04 | End: 2022-04-14

## 2022-04-04 RX ORDER — RIFAMPIN 300 MG/1
300 CAPSULE ORAL EVERY 12 HOURS
Qty: 20 CAPSULE | Refills: 0 | Status: SHIPPED | OUTPATIENT
Start: 2022-04-04 | End: 2022-04-14

## 2022-04-04 NOTE — PROGRESS NOTES
SUBJECTIVE  Azalea Acosta is 72 y.o. male  Uncorrected distance visual acuity was 20/40 -2 in the right eye and 20/30 -2 in the left eye.   Chief Complaint   Patient presents with    Glaucoma          HPI     4 months glaucoma check (dilation and review GOCT and HVF)    Patient states that his right eye has been tearing and swelling right   lower eyelid for several months. Patient states that he saw Dr. Irvin who   prescribed SMZ--80 mg which helped but it keeps coming back.  Patient is having a green discharge from his right eye.      1. DM 2003 No dbr  2. Mild COAG (Tmax 25/27 ) (+fam hx) goal = 21  Cct 506/514  SLT OD 4-10-19 (24-21)  SLT OS 6-5-19 (22-20)  3. Mild Cataracts    Latanoprost QHS OU  Timolol qam OU    Last edited by Becca Dailey MA on 4/4/2022  8:54 AM. (History)         Assessment /Plan :  1. Primary open angle glaucoma (POAG) of both eyes, mild stage Doing well, IOP within acceptable range relative to target IOP and no evidence of progression. Continue current treatment. Reviewed importance of continued compliance with treatment and follow up.      Patient instructed to continue using the following glaucoma medication as follows:  Latanoprost one drop in each eye nightly and Timolol one drop both eyes daily    Return to clinic in 4 months  or as needed.  With GOCT and Dilation       2. Nuclear sclerosis of both eyes - monitor for now   3. Cortical cataract of both eyes - monitor for now   4. Cellulitis of face - add Erythromycin ophthalmic ointment apply to the affected skin area 4 times a day, Rifampin 300 mg take one tablet by mouth every 12 hours x 10 days and Bactrim DS take one tablet by mouth 2 times a day x 10 days, could consult a Dermatologist prn

## 2022-06-20 DIAGNOSIS — H40.1131 PRIMARY OPEN ANGLE GLAUCOMA (POAG) OF BOTH EYES, MILD STAGE: ICD-10-CM

## 2022-06-20 RX ORDER — LATANOPROST 50 UG/ML
SOLUTION/ DROPS OPHTHALMIC
Qty: 2.5 ML | Refills: 11 | Status: SHIPPED | OUTPATIENT
Start: 2022-06-20 | End: 2023-02-27

## 2022-06-20 RX ORDER — TIMOLOL MALEATE 5 MG/ML
SOLUTION/ DROPS OPHTHALMIC
Qty: 5 ML | Refills: 0 | Status: SHIPPED | OUTPATIENT
Start: 2022-06-20 | End: 2023-04-25

## 2022-06-20 NOTE — TELEPHONE ENCOUNTER
----- Message from Shruthi Hever sent at 6/20/2022 12:18 PM CDT -----  Patient needs a refill     latanoprost 0.005 % ophthalmic solution  Medication  Date: 10/28/2021 Department: [facility]hthal[facility] Ordering/Authorizing: Rj Smith MD        [facility] [address] [place] [address]  [facility] [address] [address]  Phone: [phone] Fax: [phone]

## 2022-08-04 ENCOUNTER — OFFICE VISIT (OUTPATIENT)
Dept: OPHTHALMOLOGY | Facility: CLINIC | Age: 72
End: 2022-08-04
Payer: MEDICARE

## 2022-08-04 DIAGNOSIS — H25.13 NUCLEAR SCLEROSIS OF BOTH EYES: ICD-10-CM

## 2022-08-04 DIAGNOSIS — H40.1131 PRIMARY OPEN ANGLE GLAUCOMA (POAG) OF BOTH EYES, MILD STAGE: Primary | ICD-10-CM

## 2022-08-04 DIAGNOSIS — E11.9 DIABETES MELLITUS TYPE 2 WITHOUT RETINOPATHY: ICD-10-CM

## 2022-08-04 DIAGNOSIS — H26.9 CORTICAL CATARACT OF BOTH EYES: ICD-10-CM

## 2022-08-04 PROCEDURE — 92014 COMPRE OPH EXAM EST PT 1/>: CPT | Mod: S$PBB,,, | Performed by: OPHTHALMOLOGY

## 2022-08-04 PROCEDURE — 92133 CPTRZD OPH DX IMG PST SGM ON: CPT | Mod: PBBFAC | Performed by: OPHTHALMOLOGY

## 2022-08-04 PROCEDURE — 99999 PR PBB SHADOW E&M-EST. PATIENT-LVL III: ICD-10-PCS | Mod: PBBFAC,,, | Performed by: OPHTHALMOLOGY

## 2022-08-04 PROCEDURE — 99999 PR PBB SHADOW E&M-EST. PATIENT-LVL III: CPT | Mod: PBBFAC,,, | Performed by: OPHTHALMOLOGY

## 2022-08-04 PROCEDURE — 92133 POSTERIOR SEGMENT OCT OPTIC NERVE(OCULAR COHERENCE TOMOGRAPHY) - OU - BOTH EYES: ICD-10-PCS | Mod: 26,S$PBB,, | Performed by: OPHTHALMOLOGY

## 2022-08-04 PROCEDURE — 99213 OFFICE O/P EST LOW 20 MIN: CPT | Mod: PBBFAC | Performed by: OPHTHALMOLOGY

## 2022-08-04 PROCEDURE — 92014 PR EYE EXAM, EST PATIENT,COMPREHESV: ICD-10-PCS | Mod: S$PBB,,, | Performed by: OPHTHALMOLOGY

## 2022-08-04 RX ORDER — FLUOCINOLONE ACETONIDE 0.1 MG/ML
SOLUTION TOPICAL
COMMUNITY
Start: 2022-06-09

## 2022-08-04 RX ORDER — NEOMYCIN SULFATE, POLYMYXIN B SULFATE AND DEXAMETHASONE 3.5; 10000; 1 MG/ML; [USP'U]/ML; MG/ML
4 SUSPENSION/ DROPS OPHTHALMIC 2 TIMES DAILY
COMMUNITY
Start: 2022-05-07

## 2022-08-04 RX ORDER — METOPROLOL TARTRATE 100 MG/1
100 TABLET ORAL
COMMUNITY

## 2022-08-04 NOTE — PROGRESS NOTES
SUBJECTIVE  Azalea Acosta is 72 y.o. male  Uncorrected distance visual acuity was 20/25 in the right eye and 20/30 -2 in the left eye.   Chief Complaint   Patient presents with    Glaucoma          HPI     Pt in today for a 4 month GOCT with dilation. Pt states his vision is has   been stable since his last visit. Pt denies any ocular pain or discomfort.   Pt states his compliant with his drops.    1. DM 2003 No dbr  2. Mild COAG (Tmax 25/27 ) (+fam hx) goal = 21  Cct 506/514  SLT OD 4-10-19 (24-21)  SLT OS 6-5-19 (22-20)  3. NS OU  4. Cellulitis of face     Latanoprost QHS OU  Timolol qam OU      Last edited by Lilly Koenig on 8/4/2022  9:45 AM. (History)         Assessment /Plan :  1. Primary open angle glaucoma (POAG) of both eyes, mild stage Doing well, intraocular pressure (IOP) within acceptable range relative to target IOP and no evidence of progression. Continue current treatment. Reviewed importance of continued compliance with treatment and follow up.      Patient instructed to continue using the following glaucoma medication as follows:  Latanoprost one drop in each eye nightly and Timolol one drop both eyes daily    Return to clinic in 4 months  or as needed.  With IOP Check       2. Diabetes mellitus type 2 without retinopathy No diabetic retinopathy at this time. Reviewed diabetic eye precautions including avoiding tobacco use,  Good glucose control, and importance of regular follow up.      3. Nuclear sclerosis of both eyes - monitor for now   4. Cortical cataract of both eyes - monitor for now

## 2022-12-08 ENCOUNTER — OFFICE VISIT (OUTPATIENT)
Dept: OPHTHALMOLOGY | Facility: CLINIC | Age: 72
End: 2022-12-08
Payer: MEDICARE

## 2022-12-08 DIAGNOSIS — H40.1131 PRIMARY OPEN ANGLE GLAUCOMA (POAG) OF BOTH EYES, MILD STAGE: Primary | ICD-10-CM

## 2022-12-08 DIAGNOSIS — H25.13 NUCLEAR SCLEROSIS OF BOTH EYES: ICD-10-CM

## 2022-12-08 DIAGNOSIS — H26.9 CORTICAL CATARACT OF BOTH EYES: ICD-10-CM

## 2022-12-08 PROCEDURE — 99214 PR OFFICE/OUTPT VISIT, EST, LEVL IV, 30-39 MIN: ICD-10-PCS | Mod: S$PBB,,, | Performed by: OPHTHALMOLOGY

## 2022-12-08 PROCEDURE — 99999 PR PBB SHADOW E&M-EST. PATIENT-LVL II: ICD-10-PCS | Mod: PBBFAC,,, | Performed by: OPHTHALMOLOGY

## 2022-12-08 PROCEDURE — 99999 PR PBB SHADOW E&M-EST. PATIENT-LVL II: CPT | Mod: PBBFAC,,, | Performed by: OPHTHALMOLOGY

## 2022-12-08 PROCEDURE — 99214 OFFICE O/P EST MOD 30 MIN: CPT | Mod: S$PBB,,, | Performed by: OPHTHALMOLOGY

## 2022-12-08 PROCEDURE — 99212 OFFICE O/P EST SF 10 MIN: CPT | Mod: PBBFAC | Performed by: OPHTHALMOLOGY

## 2022-12-08 NOTE — PROGRESS NOTES
SUBJECTIVE  Azalea Acosta is 72 y.o. male  Uncorrected distance visual acuity was 20/20 in the right eye and 20/25+ in the left eye.   Chief Complaint   Patient presents with    Glaucoma     4 month IOP          HPI     Glaucoma     Additional comments: 4 month IOP           Comments    States that his vision is stable and that he has been compliant with gtts   as directed. Denies any new ocular issues.      1. DM 2003 No dbr  2. Mild COAG (Tmax 25/27 ) (+fam hx) goal = 21  Cct 506/514  SLT OD 4-10-19 (24-21)  SLT OS 6-5-19 (22-20)  3. NS OU  4. Cellulitis of face     Latanoprost QHS OU  Timolol qam OU            Last edited by Lilly Menjivar on 12/8/2022  9:12 AM.         Assessment /Plan :  1. Primary open angle glaucoma (POAG) of both eyes, mild stage Doing well, intraocular pressure (IOP) within acceptable range relative to target IOP and no evidence of progression. Continue current treatment. Reviewed importance of continued compliance with treatment and follow up.      Patient instructed to continue using the following glaucoma medication as follows:  Latanoprost one drop in each eye nightly and Timolol one drop both eyes daily    Return to clinic in 4 months  or as needed.  With IOP Check and GOCT     2. Nuclear sclerosis of both eyes - monitor for now   3. Cortical cataract of both eyes - monitor for now

## 2023-04-20 ENCOUNTER — TELEPHONE (OUTPATIENT)
Dept: OPHTHALMOLOGY | Facility: CLINIC | Age: 73
End: 2023-04-20
Payer: MEDICARE

## 2023-04-20 NOTE — TELEPHONE ENCOUNTER
----- Message from Lina Mccartney sent at 4/20/2023  8:45 AM CDT -----  States he appt on 4/10 was canceled and rescheduled on 4/21. Nothing is on the schedule for tomorrow. Please call pt [phone]. Thank you

## 2023-04-20 NOTE — TELEPHONE ENCOUNTER
Spoke with patient to let him know that Dr Smith had no later appts available on this day due to this being a book in day and all spots are currently filled - patient wishes to keep appt time      ----- Message from Lena Urena sent at 4/20/2023 11:57 AM CDT -----  Contact: Azalea  Patient is requesting later time for appt if possible, please call .[phone]

## 2023-04-21 ENCOUNTER — OFFICE VISIT (OUTPATIENT)
Dept: OPHTHALMOLOGY | Facility: CLINIC | Age: 73
End: 2023-04-21
Payer: MEDICARE

## 2023-04-21 DIAGNOSIS — H40.1131 PRIMARY OPEN ANGLE GLAUCOMA (POAG) OF BOTH EYES, MILD STAGE: Primary | ICD-10-CM

## 2023-04-21 PROCEDURE — 99999 PR PBB SHADOW E&M-EST. PATIENT-LVL II: ICD-10-PCS | Mod: PBBFAC,,, | Performed by: OPHTHALMOLOGY

## 2023-04-21 PROCEDURE — 99214 PR OFFICE/OUTPT VISIT, EST, LEVL IV, 30-39 MIN: ICD-10-PCS | Mod: S$PBB,,, | Performed by: OPHTHALMOLOGY

## 2023-04-21 PROCEDURE — 99214 OFFICE O/P EST MOD 30 MIN: CPT | Mod: S$PBB,,, | Performed by: OPHTHALMOLOGY

## 2023-04-21 PROCEDURE — 92133 POSTERIOR SEGMENT OCT OPTIC NERVE(OCULAR COHERENCE TOMOGRAPHY) - OU - BOTH EYES: ICD-10-PCS | Mod: 26,S$PBB,, | Performed by: OPHTHALMOLOGY

## 2023-04-21 PROCEDURE — 92133 CPTRZD OPH DX IMG PST SGM ON: CPT | Mod: PBBFAC | Performed by: OPHTHALMOLOGY

## 2023-04-21 PROCEDURE — 99999 PR PBB SHADOW E&M-EST. PATIENT-LVL II: CPT | Mod: PBBFAC,,, | Performed by: OPHTHALMOLOGY

## 2023-04-21 PROCEDURE — 99212 OFFICE O/P EST SF 10 MIN: CPT | Mod: PBBFAC | Performed by: OPHTHALMOLOGY

## 2023-04-21 NOTE — PROGRESS NOTES
SUBJECTIVE  Azalea Acosta is 73 y.o. male  Uncorrected distance visual acuity was 20/20 in the right eye and 20/25 in the left eye.   Chief Complaint   Patient presents with    Glaucoma     4 month IOP and GOCT           HPI     Glaucoma     Additional comments: 4 month IOP and GOCT            Comments     States that his vision is stable and that he has been compliant with gtts   as directed.     1. DM 2003 No dbr  2. Mild COAG (Tmax 25/27 ) (+fam hx) goal = 21  Cct 506/514  SLT OD 4-10-19 (24-21)  SLT OS 6-5-19 (22-20)  3. NS OU  4. Cellulitis of face     Latanoprost QHS OU  Timolol qam OU            Last edited by Lilly Menjivar on 4/21/2023  9:01 AM.         Assessment /Plan :  1. Primary open angle glaucoma (POAG) of both eyes, mild stage Doing well, intraocular pressure (IOP) within acceptable range relative to target IOP and no evidence of progression. Continue current treatment. Reviewed importance of continued compliance with treatment and follow up.      Patient instructed to continue using the following glaucoma medication as follows:  Latanoprost one drop in each eye nightly and Timolol one drop in each eye every 12 hours    Return to clinic in 4 months  or as needed.  With IOP Check and HVF 24-2

## 2023-07-10 DIAGNOSIS — H40.1131 PRIMARY OPEN ANGLE GLAUCOMA (POAG) OF BOTH EYES, MILD STAGE: ICD-10-CM

## 2023-07-11 RX ORDER — LATANOPROST 50 UG/ML
SOLUTION/ DROPS OPHTHALMIC
Qty: 3 ML | Refills: 0 | Status: SHIPPED | OUTPATIENT
Start: 2023-07-11 | End: 2023-09-18 | Stop reason: SDUPTHER

## 2023-09-18 ENCOUNTER — OFFICE VISIT (OUTPATIENT)
Dept: OPHTHALMOLOGY | Facility: CLINIC | Age: 73
End: 2023-09-18
Payer: MEDICARE

## 2023-09-18 DIAGNOSIS — H26.9 CORTICAL CATARACT OF BOTH EYES: ICD-10-CM

## 2023-09-18 DIAGNOSIS — H40.1131 PRIMARY OPEN ANGLE GLAUCOMA (POAG) OF BOTH EYES, MILD STAGE: Primary | ICD-10-CM

## 2023-09-18 DIAGNOSIS — H25.13 NUCLEAR SCLEROSIS OF BOTH EYES: ICD-10-CM

## 2023-09-18 PROCEDURE — 99214 OFFICE O/P EST MOD 30 MIN: CPT | Mod: S$PBB,,, | Performed by: OPHTHALMOLOGY

## 2023-09-18 PROCEDURE — 92083 EXTENDED VISUAL FIELD XM: CPT | Mod: PBBFAC | Performed by: OPHTHALMOLOGY

## 2023-09-18 PROCEDURE — 99214 PR OFFICE/OUTPT VISIT, EST, LEVL IV, 30-39 MIN: ICD-10-PCS | Mod: S$PBB,,, | Performed by: OPHTHALMOLOGY

## 2023-09-18 PROCEDURE — 92083 HUMPHREY VISUAL FIELD - OU - BOTH EYES: ICD-10-PCS | Mod: 26,S$PBB,, | Performed by: OPHTHALMOLOGY

## 2023-09-18 PROCEDURE — 99213 OFFICE O/P EST LOW 20 MIN: CPT | Mod: PBBFAC | Performed by: OPHTHALMOLOGY

## 2023-09-18 PROCEDURE — 99999 PR PBB SHADOW E&M-EST. PATIENT-LVL III: ICD-10-PCS | Mod: PBBFAC,,, | Performed by: OPHTHALMOLOGY

## 2023-09-18 PROCEDURE — 99999 PR PBB SHADOW E&M-EST. PATIENT-LVL III: CPT | Mod: PBBFAC,,, | Performed by: OPHTHALMOLOGY

## 2023-09-18 RX ORDER — TIMOLOL MALEATE 5 MG/ML
SOLUTION/ DROPS OPHTHALMIC
Qty: 15 ML | Refills: 3 | Status: SHIPPED | OUTPATIENT
Start: 2023-09-18 | End: 2024-03-07 | Stop reason: SDUPTHER

## 2023-09-18 RX ORDER — KETOROLAC TROMETHAMINE 5 MG/ML
SOLUTION OPHTHALMIC
Qty: 5 ML | Refills: 0 | Status: SHIPPED | OUTPATIENT
Start: 2023-09-18

## 2023-09-18 RX ORDER — LATANOPROST 50 UG/ML
1 SOLUTION/ DROPS OPHTHALMIC NIGHTLY
Qty: 7.5 ML | Refills: 4 | Status: SHIPPED | OUTPATIENT
Start: 2023-09-18 | End: 2024-03-07 | Stop reason: SDUPTHER

## 2023-09-18 NOTE — PROGRESS NOTES
ADRIEL Acosta is 73 y.o. male  Uncorrected distance visual acuity was 20/20 -1 in the right eye and 20/30 -2 in the left eye.   Chief Complaint   Patient presents with    Glaucoma     4m HVF IOP check. Denies any pain or irritation. Va stable. 100% compliant with gtts.           HPI     Glaucoma     Additional comments: 4m HVF IOP check. Denies any pain or irritation. Va   stable. 100% compliant with gtts.            Comments    1. DM 2003 No dbr  2. Mild COAG (Tmax 25/27 ) (+fam hx) goal = 21  Cct 506/514  SLT OD 4-10-19 (24-21)  SLT OS 6-5-19 (22-20)  3. NS OU  4. Cellulitis of face     Latanoprost QHS OU  Timolol qam OU            Last edited by Damian Sales on 9/18/2023  9:48 AM.         Assessment /Plan :  1. Primary open angle glaucoma (POAG) of both eyes, mild stage Intraocular pressure (IOP) not within acceptable range relative to target IOP with risk of irreversible visual loss. Better IOP control is recommended. Treatment options may include change or additional medications, Selective Laser Trabeculoplasty (SLT laser), and/or incisional glaucoma surgery. Reviewed importance of continued compliance with treatment and follow up.     Patient chooses schedule SLT  OU  Continue Latanoprost one drop in each eye every night and Timolol one drop in each eye every morning     2. Nuclear sclerosis of both eyes - monitor for now   3. Cortical cataract of both eyes - monitor for now     Return for the SLT OU

## 2023-11-13 ENCOUNTER — OFFICE VISIT (OUTPATIENT)
Dept: OPHTHALMOLOGY | Facility: CLINIC | Age: 73
End: 2023-11-13
Payer: MEDICARE

## 2023-11-13 DIAGNOSIS — H40.1131 PRIMARY OPEN ANGLE GLAUCOMA (POAG) OF BOTH EYES, MILD STAGE: Primary | ICD-10-CM

## 2023-11-13 PROCEDURE — 99213 OFFICE O/P EST LOW 20 MIN: CPT | Mod: PBBFAC | Performed by: OPHTHALMOLOGY

## 2023-11-13 PROCEDURE — 99999 PR PBB SHADOW E&M-EST. PATIENT-LVL III: ICD-10-PCS | Mod: PBBFAC,,, | Performed by: OPHTHALMOLOGY

## 2023-11-13 PROCEDURE — 99999 PR PBB SHADOW E&M-EST. PATIENT-LVL III: CPT | Mod: PBBFAC,,, | Performed by: OPHTHALMOLOGY

## 2023-11-13 PROCEDURE — 99214 OFFICE O/P EST MOD 30 MIN: CPT | Mod: S$PBB,,, | Performed by: OPHTHALMOLOGY

## 2023-11-13 PROCEDURE — 99214 PR OFFICE/OUTPT VISIT, EST, LEVL IV, 30-39 MIN: ICD-10-PCS | Mod: S$PBB,,, | Performed by: OPHTHALMOLOGY

## 2023-11-13 NOTE — PROGRESS NOTES
SUBJECTIVE  Azalea Acosta is 73 y.o. male  Uncorrected distance visual acuity was 20/25 -1 in the right eye and 20/30 -1 in the left eye.   Chief Complaint   Patient presents with    Glaucoma     Pt reports for 2 mo POAG recheck: Pt states no changes, no pain or irritation. States he is using eye drops as directed.           HPI     Glaucoma     Additional comments: Pt reports for 2 mo POAG recheck: Pt states no   changes, no pain or irritation. States he is using eye drops as directed.            Comments    1. DM 2003 No dbr  2. Mild COAG (Tmax 25/27 ) (+fam hx) goal = 21  Cct 506/514  SLT OD 4-10-19 (24-21)  SLT OS 6-5-19 (22-20)  3. NS OU  4. Cellulitis of face      Latanoprost QHS OU  Timolol qam OU            Last edited by Camryn Cheney on 11/13/2023  9:59 AM.         Assessment /Plan :  1. Primary open angle glaucoma (POAG) of both eyes, mild stage 5 Weeks S/P SLT OU well tolerated.    excellent response to SLT and continue current regimen.    Return to clinic in 4 months  or as needed.  With GOCT and Dilation

## 2024-01-24 ENCOUNTER — TELEPHONE (OUTPATIENT)
Dept: OPHTHALMOLOGY | Facility: CLINIC | Age: 74
End: 2024-01-24
Payer: MEDICARE

## 2024-01-24 NOTE — TELEPHONE ENCOUNTER
Spoke with patient in regards to booking out appointment with [facility]. Told patient why we were booking out and patient expressed understanding. Patient stated he will talk with his wife to see when she can bring him and call back to reschedule.

## 2024-03-07 ENCOUNTER — OFFICE VISIT (OUTPATIENT)
Dept: OPHTHALMOLOGY | Facility: CLINIC | Age: 74
End: 2024-03-07
Payer: MEDICARE

## 2024-03-07 DIAGNOSIS — E11.9 DIABETES MELLITUS TYPE 2 WITHOUT RETINOPATHY: ICD-10-CM

## 2024-03-07 DIAGNOSIS — H40.1131 PRIMARY OPEN ANGLE GLAUCOMA (POAG) OF BOTH EYES, MILD STAGE: Primary | ICD-10-CM

## 2024-03-07 DIAGNOSIS — H26.9 CORTICAL CATARACT OF BOTH EYES: ICD-10-CM

## 2024-03-07 DIAGNOSIS — H25.13 NUCLEAR SCLEROSIS OF BOTH EYES: ICD-10-CM

## 2024-03-07 PROCEDURE — 99999 PR PBB SHADOW E&M-EST. PATIENT-LVL III: CPT | Mod: PBBFAC,,, | Performed by: OPHTHALMOLOGY

## 2024-03-07 PROCEDURE — 99214 OFFICE O/P EST MOD 30 MIN: CPT | Mod: S$PBB,,, | Performed by: OPHTHALMOLOGY

## 2024-03-07 PROCEDURE — 92133 CPTRZD OPH DX IMG PST SGM ON: CPT | Mod: PBBFAC | Performed by: OPHTHALMOLOGY

## 2024-03-07 PROCEDURE — 99213 OFFICE O/P EST LOW 20 MIN: CPT | Mod: PBBFAC | Performed by: OPHTHALMOLOGY

## 2024-03-07 RX ORDER — LATANOPROST 50 UG/ML
1 SOLUTION/ DROPS OPHTHALMIC NIGHTLY
Qty: 7.5 ML | Refills: 4 | Status: SHIPPED | OUTPATIENT
Start: 2024-03-07

## 2024-03-07 RX ORDER — TIMOLOL MALEATE 5 MG/ML
SOLUTION/ DROPS OPHTHALMIC
Qty: 15 ML | Refills: 3 | Status: SHIPPED | OUTPATIENT
Start: 2024-03-07

## 2024-03-07 NOTE — PROGRESS NOTES
SUBJECTIVE  Azalea Acosta is 74 y.o. male  Uncorrected distance visual acuity was 20/20 -2 in the right eye and 20/30 -2 in the left eye.   Chief Complaint   Patient presents with    Glaucoma     Pt reports for 4m GOCT dil. Denies any pain or irritation. Va stable. 100% compliat with gtts.           HPI     Glaucoma     Additional comments: Pt reports for 4m GOCT dil. Denies any pain or   irritation. Va stable. 100% compliat with gtts.            Comments    1. DM 2003 No dbr  2. Mild COAG (Tmax 25/27 ) (+fam hx) goal = 21  Cct 506/514  SLT OD 4-10-19 (24-21) + 10/4/23 (29-19)  SLT OS 6-5-19 (22-20) + 10/4/23 (26-16.5)  3. NS OU  4. Cellulitis of face     Latanoprost QHS OU  Timolol qam OU             Last edited by Damian Sales on 3/7/2024  9:24 AM.         Assessment /Plan :  1. Primary open angle glaucoma (POAG) of both eyes, mild stage Doing well, intraocular pressure (IOP) within acceptable range relative to target IOP and no evidence of progression. Continue current treatment. Reviewed importance of continued compliance with treatment and follow up.      Patient instructed to continue using the following glaucoma medication as follows:  Latanoprost one drop in each eye nightly and Timolol one drop both eyes daily    Return to clinic in 4 months  or as needed.  With IOP Check and HVF 24-2     2. Diabetes mellitus type 2 without retinopathy - No diabetic retinopathy at this time. Reviewed diabetic eye precautions including avoiding tobacco use,  Good glucose control, and importance of regular follow up.      3. Nuclear sclerosis of both eyes  -- Condition stable, no therapeutic change required. Monitoring routinely.     4. Cortical cataract of both eyes  -- Condition stable, no therapeutic change required. Monitoring routinely.

## 2024-04-08 ENCOUNTER — TELEPHONE (OUTPATIENT)
Dept: OPHTHALMOLOGY | Facility: CLINIC | Age: 74
End: 2024-04-08
Payer: MEDICARE

## 2024-04-08 DIAGNOSIS — L03.211 CELLULITIS OF FACE: ICD-10-CM

## 2024-04-08 RX ORDER — SULFAMETHOXAZOLE AND TRIMETHOPRIM 800; 160 MG/1; MG/1
1 TABLET ORAL 2 TIMES DAILY
Qty: 20 TABLET | Refills: 0 | Status: SHIPPED | OUTPATIENT
Start: 2024-04-08 | End: 2024-04-18

## 2024-04-08 NOTE — TELEPHONE ENCOUNTER
Spoke with mr. Acosta today and gave him 3 different options to be able to come in to see a doctor.  Friday with Dr. Jacob,  Wednesday with Dr. Rios,  or next Thursday with Dr. BUCHANAN.  He is going to ask his wife when can she bring him and will call me back and let me know. kf

## 2024-04-08 NOTE — TELEPHONE ENCOUNTER
Pt called to say that he was experiencing cellulitis-like symptoms last week and took the medication he was taking previously. He says he is feeling better but it is not completely gone. Let pt know that Dr. Smith approved him taking another round of bactrim and that we will see him with Dr. Jacob to make sure that he is taken care of.

## 2024-04-27 NOTE — TELEPHONE ENCOUNTER
Patient : Roberto Das Age: 76 year old Sex: male   MRN: [id number] Encounter Date: 4/27/2024    History     Chief Complaint   Patient presents with    Fall       HPI    Roberto Das is a 76 year old presenting to the emergency department with complaint of fall and head injury. Patient states that he has an ongoing balance problem for the past few years after a bleed to the brain a few years ago. Patient states while he was in the process of trying to sit down on the toilet he noticed that he had too much of his weight on the toes and he fell forward and hit the right side of his forehead on the toilet and now has swelling to that area. Patient denies losing consciousness, patient states he is on aspirin and Plavix. Patient denies any numbness or tingling to the arms or legs, denies any visual changes or vomiting, does state a mild headache where the hematoma is, denies any neck pain. Patient also states some mild pain to the left pinky finger however is able to move it, denies any numbness or tingling to the finger, no wounds/laceration.    Allergies   Allergen Reactions    Brimonidine Other (See Comments)     Other reaction(s): Eyes Water & Itch    Penicillins HIVES and Other (See Comments)     Childhood reaction    As a child       No current facility-administered medications for this encounter.     No current outpatient medications on file.       No past medical history on file.    No past surgical history on file.    No family history on file.         Review of Systems     Review of Systems   Musculoskeletal:  Negative for back pain and neck pain.        Positive for left pinky finger pain   Skin:  Negative for wound.   Neurological:  Positive for headaches.   All other systems reviewed and are negative.      Physical Exam     ED Triage Vitals [04/27/24 1509]   ED Triage Vitals Group      Temp 98.4 °F (36.9 °C)      Heart Rate 70      Resp 18      BP (!) 150/77      SpO2 97 %      EtCO2 mmHg       
Please see the attached refill request.  
Height 5' 11\" (1.803 m)      Weight 281 lb (127.5 kg)      Weight Scale Used Standing scale      BMI (Calculated) 39.19      IBW/kg (Calculated) 75.3       Physical Exam  Vitals and nursing note reviewed.   Constitutional:       General: He is not in acute distress.     Appearance: Normal appearance. He is normal weight. He is not ill-appearing, toxic-appearing or diaphoretic.   HENT:      Head: Normocephalic.      Comments: Patient with a moderate right sided forehead hematoma noted, no wounds or lacerations/bleeding noted. Negative vick sign/raccoon eyes.      Right Ear: External ear normal.      Left Ear: External ear normal.      Nose: Nose normal.      Neck: Normal range of motion and neck supple. No rigidity or tenderness.   Eyes:      Conjunctiva/sclera: Conjunctivae normal.      Pupils: Pupils are equal, round, and reactive to light.   Cardiovascular:      Rate and Rhythm: Normal rate and regular rhythm.      Heart sounds: Normal heart sounds. No murmur heard.     Comments: Radial and DP pulses 2+ symmetric bilaterally.  Pulmonary:      Effort: Pulmonary effort is normal. No respiratory distress.      Breath sounds: Normal breath sounds. No stridor. No wheezing, rhonchi or rales.   Chest:      Chest wall: No tenderness.   Musculoskeletal:      Right lower leg: No edema.      Left lower leg: No edema.      Comments: Normal range of motion of upper and lower extremities bilaterally.    LUE: No obvious deformity noted, patient does have mild developing ecchymosis to the base of the left pinky finger, normal range of motion of left fifth digit at MCP, PIP, DIP joints, no wounds or lacerations noted.  Gross sensation intact and radial pulse 2+ to left hand.  No bony tenderness to palpation of the left pinky finger   Skin:     General: Skin is warm and dry.   Neurological:      Mental Status: He is alert and oriented to person, place, and time.      Cranial Nerves: No cranial nerve deficit.   Psychiatric:        
 Mood and Affect: Mood normal.         Behavior: Behavior normal.           Procedures     Procedures      Radiology Results     Imaging Results              XR FINGER(S) 2 OR MORE VIEWS LEFT (Final result)  Result time 04/27/24 17:19:59      Final result                   Impression:    1. No acute findings.    Electronically Signed by: DUONG MUNOZ M.D.   Signed on: 4/27/2024 5:19 PM   Workstation ID: [id number]               Narrative:    XR FINGER(S) 2 OR MORE VIEWS LEFT,    HISTORY: L pinky finger pain,    COMPARISON: None.    FINDINGS:     Bones: There is no evidence of acute displaced fracture, or dislocation.     Joints: The visualized joint spaces are preserved. Joint alignment is  satisfactory.    Soft tissues: Soft tissues are within normal limits.                                        CT HEAD WO CONTRAST (Final result)  Result time 04/27/24 17:00:38      Final result                   Impression:    1. No acute intracranial bleed or calvarial fracture.  2. Right anterior frontal scalp soft tissue hematoma/swelling is noted.      Electronically Signed by: DUONG MUNOZ M.D.   Signed on: 4/27/2024 5:00 PM   Workstation ID: [id number]               Narrative:    CT HEAD WITHOUT CONTRAST    HISTORY: Trauma    COMPARISON: None.    TECHNIQUE: CT scan of the head is obtained without  IV contrast.      One or more of the following radiation dose reduction techniques were used  for this examination: Exposure control, adjustment of the mA and/or kV  according to patient size, or use of iterative reconstruction technique.    FINDINGS:    No mass effect or midline shift is noted. No abnormal extra-axial fluid  collections are noted. Gray-white differentiation is preserved. No large  vascular territorial acute ischemic infarct is noted. No unenhanced CT  evidence of intracranial mass lesion is noted. CT without contrast may be  insensitive for detection of subtle mass lesion or early acute 
ischemic  infarct.    Bony calvarium is intact. Visualized paranasal sinuses and mastoid air  cells are patent and aerated. Right anterior frontal scalp soft tissue  hematoma/swelling is noted.                                      ED Medications     ED Medication Orders (From admission, onward)      Ordered Start     Status Ordering Provider    04/27/24 1736 04/27/24 1745  acetaminophen (TYLENOL) tablet 1,000 mg  ONCE         Last MAR action: Given MELANI HUTTON            ED Course     Vitals:    04/27/24 1509 04/27/24 1704 04/27/24 1819   BP: (!) 150/77 (!) 166/78 (!) 149/77   BP Location: LUE - Left upper extremity RUE - Right upper extremity RUE - Right upper extremity   Patient Position: Sitting Sitting/High-Mccann's Semi-Mccann's   Pulse: 70 62 73   Resp: 18 18 18   Temp: 98.4 °F (36.9 °C)  98.2 °F (36.8 °C)   TempSrc:   Temporal   SpO2: 97% 97% 97%   Weight: 127.5 kg (281 lb)     Height: 5' 11\" (1.803 m)              Radiology Review: I have independently interpreted the CT Head and have found No Intracranial Hemorrhage.  I am awaiting on the final radiology read.      Medical Decision Making     8:31 PM                        Patient is a 76 year old with complaint of fall, head injury, and left pinky finger pain. On presentation, patient is afebrile without tachycardia, tachypnea, hypotension, hypoxia on room air.  On physical exam, patient with a moderate right sided forehead hematoma noted, no wounds or lacerations/bleeding noted. Negative vick sign/raccoon eyes. No obvious deformity noted, patient does have mild developing ecchymosis to the base of the left pinky finger, normal range of motion of left fifth digit at MCP, PIP, DIP joints, no wounds or lacerations noted.  Gross sensation intact and radial pulse 2+ to left hand.  No bony tenderness to palpation of the left pinky finger.  As patient is 76 years old with head injury on aspirin/Plavix with history of intracranial bleeding, CT scan of 
head ordered in the ED.  No neck injury and patient denies any posterior neck pain and therefore will not order CT C-spine.  X-ray of left pinky finger also ordered in the ED. Patient given Tylenol and ice pack in the emergency room for treatment of hematoma/headache.    1730: CT head without acute intracranial bleed, there is right anterior frontal scalp soft tissue hematoma/swelling.  X-ray of left fifth digit does not show acute fracture or dislocation.  Checked back in on patient, patient resting comfortably in bed.  Discussed with patient imaging results which are unremarkable at this time, discussed with patient to rest, take Tylenol for pain control as needed and follow-up with PCP within 1 week.  Discussed with patient to continue to monitor symptoms and use ice for swelling of the forehead.  All questions answered, strict return precautions given including changing or worsening pain, worsening headache, new numbness/tingling or vomiting or visual changes.  Patient understands and agrees with plan.  Patient to be discharged in stable condition.    Impression:  The primary encounter diagnosis was Fall, initial encounter. Diagnoses of Hematoma of frontal scalp, initial encounter and Pain in finger of left hand were also pertinent to this visit.    Follow Up:  Aron Lozano MD  [address] [facility] [address]  [facility] [address]  [phone]    Schedule an appointment as soon as possible for a visit   Follow-up with your primary care provider within 1 week, if you do not have a primary care provider, call the number listed above to schedule a follow-up soon as possible.     Pt is discharged in good condition.    Patient informed to return to the emergency room department immediately if symptoms worsen, persist, concerns, new symptoms, or followup cannot be obtained.         Beth Sanabria PA-C  04/27/24 2032    
same name as above

## 2024-08-13 ENCOUNTER — TELEPHONE (OUTPATIENT)
Dept: OPHTHALMOLOGY | Facility: CLINIC | Age: 74
End: 2024-08-13
Payer: MEDICARE

## 2024-08-13 NOTE — TELEPHONE ENCOUNTER
Left vm for pt to call back to schedule appt with Dr. Smith.     ----- Message from Camryn Cheney sent at 2024  3:03 PM CDT -----  [facility] IOP and REV HVF appt needed, due in July  Please call this pt, he is trying to set his FU with [facility] 4mo IOP and REV HVF, / Pt is aware you will call him back to schedule. SRB  ----- Message -----  From: Juan Alberto Tellez  Sent: 2024   1:51 PM CDT  To: Luis [facility] Staff      Type:  Test Results    Who Called: Dinh   Name of Test (Lab/Mammo/Etc): visual field   Date of Test:   Ordering Provider: luis  Where the test was performed[facility]   Would the patient rather a call back or a response via [facility]? Call   Best Call Back Number:  [phone]  Additional Information:      Sachi called in regards to patient field test

## 2024-08-13 NOTE — TELEPHONE ENCOUNTER
R/S patient to [facility] at 10:45 am. For HVF 4m NS COAG Diabetic exam. Patient will be there.  ----- Message from Raine Acosta sent at 8/13/2024  2:00 PM CDT -----  Contact: self [phone]  Type:  Patient Returning Call    Who Called:Azalea  Who Left Message for Patient:Otf  Does the patient know what this is regarding?:scheduling an appointment  Would the patient rather a call back or a response via [facility]? Call back   Best Call Back Number:[phone]  Additional Information:

## 2024-08-20 ENCOUNTER — OFFICE VISIT (OUTPATIENT)
Dept: OPHTHALMOLOGY | Facility: CLINIC | Age: 74
End: 2024-08-20
Payer: MEDICARE

## 2024-08-20 DIAGNOSIS — H40.1131 PRIMARY OPEN ANGLE GLAUCOMA (POAG) OF BOTH EYES, MILD STAGE: Primary | ICD-10-CM

## 2024-08-20 DIAGNOSIS — H26.9 CORTICAL CATARACT OF BOTH EYES: ICD-10-CM

## 2024-08-20 DIAGNOSIS — H25.13 NUCLEAR SCLEROSIS OF BOTH EYES: ICD-10-CM

## 2024-08-20 PROCEDURE — 99213 OFFICE O/P EST LOW 20 MIN: CPT | Mod: PBBFAC | Performed by: OPHTHALMOLOGY

## 2024-08-20 PROCEDURE — 99999 PR PBB SHADOW E&M-EST. PATIENT-LVL III: CPT | Mod: PBBFAC,,, | Performed by: OPHTHALMOLOGY

## 2024-08-20 PROCEDURE — 92083 EXTENDED VISUAL FIELD XM: CPT | Mod: PBBFAC | Performed by: OPHTHALMOLOGY

## 2024-08-20 PROCEDURE — 99214 OFFICE O/P EST MOD 30 MIN: CPT | Mod: S$PBB,,, | Performed by: OPHTHALMOLOGY

## 2024-08-20 NOTE — PROGRESS NOTES
SUBJECTIVE  Azalea Acosta is 74 y.o. male  Uncorrected distance visual acuity was 20/20 -1 in the right eye and 20/25 -1 in the left eye.   Chief Complaint   Patient presents with    Glaucoma     Pt here for 4m IOP HVF check. No pain or discomfort. VA stable. 100% compliant with gtts.           HPI     Glaucoma     Additional comments: Pt here for 4m IOP HVF check. No pain or discomfort.   VA stable. 100% compliant with gtts.            Comments    1. DM 2003 No dbr  2. Mild COAG (Tmax 25/27 ) (+fam hx) goal = 21  Cct 506/514  SLT OD 4-10-19 (24-21) + 10/4/23 (29-19)  SLT OS 6-5-19 (22-20) + 10/4/23 (26-16.5)  3. NS OU  4. Cellulitis of face     Latanoprost QHS OU  Timolol qam OU             Last edited by Otf Nelson MA on 8/20/2024 11:04 AM.         Assessment /Plan :  1. Primary open angle glaucoma (POAG) of both eyes, mild stage Doing well, intraocular pressure (IOP) within acceptable range relative to target IOP and no evidence of progression. Continue current treatment. Reviewed importance of continued compliance with treatment and follow up.      Patient instructed to continue using the following glaucoma medication as follows:  Latanoprost one drop in each eye nightly and Timolol one drop both eyes daily    Return to clinic in 4 months with Dr. Jacob or as needed.  With IOP Check and GOCT     2. Nuclear sclerosis of both eyes - Not visually significant. Continue to monitor.   3. Cortical cataract of both eyes - Not visually significant. Continue to monitor

## 2024-09-19 ENCOUNTER — TELEPHONE (OUTPATIENT)
Dept: OPHTHALMOLOGY | Facility: CLINIC | Age: 74
End: 2024-09-19
Payer: MEDICARE

## 2024-09-19 NOTE — TELEPHONE ENCOUNTER
Returned call, no answer-- unable to LVM    ----- Message from Vivi Oramous sent at 9/19/2024  8:44 AM CDT -----  Contact: Pt  [phone]  Would like to receive medical advice.    Would they like a call back or a response via [facility]:  call back     Additional information: pt's wife  Sachi is calling because pt has questions about his medication. He is requesting a call back from the nurse.

## 2024-09-20 ENCOUNTER — TELEPHONE (OUTPATIENT)
Dept: OPHTHALMOLOGY | Facility: CLINIC | Age: 74
End: 2024-09-20
Payer: MEDICARE

## 2024-09-20 NOTE — TELEPHONE ENCOUNTER
Called patient to discuss timolol. Patient states he heard there was a recall and states the drop was making his vision blurry. Looked up recall and informed patient it was only in [place] and to use artifical tears 10-15min after using drop to help with the blurred vision.     ----- Message from Jennifer Kyle sent at 9/20/2024 11:13 AM CDT -----  Contact: pt's wife/Sachi  This is the second request, no one has called him back      Type:  Request call back  Name of Caller:pt's wife/Sachi  Best Call Back Number: [phone]  Additional Information:  pt h as questions about his medications, please call pt back

## 2024-10-16 ENCOUNTER — OFFICE VISIT (OUTPATIENT)
Dept: OPHTHALMOLOGY | Facility: CLINIC | Age: 74
End: 2024-10-16
Payer: MEDICARE

## 2024-10-16 DIAGNOSIS — H40.1131 PRIMARY OPEN ANGLE GLAUCOMA (POAG) OF BOTH EYES, MILD STAGE: Primary | ICD-10-CM

## 2024-10-16 DIAGNOSIS — H25.13 NUCLEAR SCLEROSIS OF BOTH EYES: ICD-10-CM

## 2024-10-16 DIAGNOSIS — H26.9 CORTICAL CATARACT OF BOTH EYES: ICD-10-CM

## 2024-10-16 DIAGNOSIS — H53.8 BLURRED VISION, BILATERAL: ICD-10-CM

## 2024-10-16 PROCEDURE — 99999 PR PBB SHADOW E&M-EST. PATIENT-LVL II: CPT | Mod: PBBFAC,,, | Performed by: OPTOMETRIST

## 2024-10-16 PROCEDURE — 99212 OFFICE O/P EST SF 10 MIN: CPT | Mod: PBBFAC | Performed by: OPTOMETRIST

## 2024-10-16 PROCEDURE — 99213 OFFICE O/P EST LOW 20 MIN: CPT | Mod: S$PBB,,, | Performed by: OPTOMETRIST

## 2024-10-16 NOTE — PROGRESS NOTES
HPI     Blurred Vision            Comments: Pt here for blurred vision. No pain or irritation. Pt states he   has stopped taking Timolol for over a month now. Pt is taking Latanoprost   2 times a day.           Comments    [place]  Grand daughter is Becky    1. DM 2003 No dbr  2. Mild COAG (Tmax 25/27 ) (+fam hx) goal = 21  Cct 506/514  SLT OD 4-10-19 (24-21) + 10/4/23 (29-19)  SLT OS 6-5-19 (22-20) + 10/4/23 (26-16.5)  3. NS OU  4. Cellulitis of face     Latanoprost QHS OU  Timolol qam OU             Last edited by Carey Bradley on 10/16/2024  1:27 PM.            Assessment /Plan     For exam results, see Encounter Report.    1. Primary open angle glaucoma (POAG) of both eyes, mild stage  S/p SLT OU w/ Dr [facility]  Pt self d/c timolol due to recall concerns. IOP at target today with Latanoprost QHS.  Pt elect to continue Latanoprost QHS at this time.   RTC with Dr Jacob as scheduled for IOP check, consider restarting timolol if IOP gets elevated.     2. Nuclear sclerosis of both eyes   Cortical cataract of both eyes   NVS, observe.    3. Blurred vision bilateral  Pt reports blurred vision when using Timolol daily.   Refracts well on exam today. Observe.     RTC as scheduled with Dr. Jacob, sooner if changes to vision or worsening symptoms.  Discussed above and answered questions.                    No (0)

## 2024-12-16 ENCOUNTER — OFFICE VISIT (OUTPATIENT)
Dept: OPHTHALMOLOGY | Facility: CLINIC | Age: 74
End: 2024-12-16
Payer: MEDICARE

## 2024-12-16 DIAGNOSIS — H40.1131 PRIMARY OPEN ANGLE GLAUCOMA (POAG) OF BOTH EYES, MILD STAGE: Primary | ICD-10-CM

## 2024-12-16 DIAGNOSIS — H26.9 CORTICAL CATARACT OF BOTH EYES: ICD-10-CM

## 2024-12-16 DIAGNOSIS — H25.13 NUCLEAR SCLEROSIS OF BOTH EYES: ICD-10-CM

## 2024-12-16 PROCEDURE — 92133 CPTRZD OPH DX IMG PST SGM ON: CPT | Mod: PBBFAC | Performed by: OPHTHALMOLOGY

## 2024-12-16 PROCEDURE — 99999 PR PBB SHADOW E&M-EST. PATIENT-LVL II: CPT | Mod: PBBFAC,,, | Performed by: OPHTHALMOLOGY

## 2024-12-16 PROCEDURE — 99212 OFFICE O/P EST SF 10 MIN: CPT | Mod: PBBFAC | Performed by: OPHTHALMOLOGY

## 2024-12-16 PROCEDURE — 99214 OFFICE O/P EST MOD 30 MIN: CPT | Mod: S$PBB,,, | Performed by: OPHTHALMOLOGY

## 2024-12-16 NOTE — PROGRESS NOTES
HPI     Glaucoma     Additional comments: Patient present today for IOP check  would like   refill on gtts  Latanoprost QHS OU  Timolol qam OU                Comments     Blurred Vision            Comments: Pt here for blurred vision. No pain or irritation. Pt states he     has stopped taking Timolol for over a month now. Pt is taking Latanoprost   2 times a day.           Comments    [place] [facility]  Grand daughter is Becky     1. DM 2003 No dbr  2. Mild COAG (Tmax 25/27 ) (+fam hx) goal = 21  Cct 506/514  SLT OD 4-10-19 (24-21) + 10/4/23 (29-19)  SLT OS 6-5-19 (22-20) + 10/4/23 (26-16.5)  3. NS OU  4. Cellulitis of face      Latanoprost QHS OU  Timolol qam OU               Last edited by Carey Bradley on 10/16/2024  1:27 PM.                Last edited by Melvi Bowden on 12/16/2024 10:44 AM.            Assessment /Plan     For exam results, see Encounter Report.    Primary open angle glaucoma (POAG) of both eyes, mild stage  Doing well, intraocular pressure (IOP) within acceptable range relative to target IOP with no evidence of progression. Continue current treatment. Reviewed importance of continued compliance with treatment and follow up.      Ok to discontinue Timolol    Continue current gtts:  Latanoprost one drop in each eye nightly    RTC in 4 months with CCT and DFE.      Nuclear sclerosis of both eyes  Cortical cataract of both eyes  Condition stable, no therapeutic intervention necessary at this time. Will continue to monitor.

## 2025-04-25 ENCOUNTER — OFFICE VISIT (OUTPATIENT)
Dept: OPHTHALMOLOGY | Facility: CLINIC | Age: 75
End: 2025-04-25
Payer: MEDICARE

## 2025-04-25 DIAGNOSIS — H40.1131 PRIMARY OPEN ANGLE GLAUCOMA (POAG) OF BOTH EYES, MILD STAGE: Primary | ICD-10-CM

## 2025-04-25 DIAGNOSIS — H25.13 NUCLEAR SCLEROSIS OF BOTH EYES: ICD-10-CM

## 2025-04-25 DIAGNOSIS — H26.9 CORTICAL CATARACT OF BOTH EYES: ICD-10-CM

## 2025-04-25 PROCEDURE — 99213 OFFICE O/P EST LOW 20 MIN: CPT | Mod: PBBFAC | Performed by: OPHTHALMOLOGY

## 2025-04-25 PROCEDURE — 99999 PR PBB SHADOW E&M-EST. PATIENT-LVL III: CPT | Mod: PBBFAC,,, | Performed by: OPHTHALMOLOGY

## 2025-04-25 NOTE — PROGRESS NOTES
HPI     Glaucoma     Additional comments: [place] [facility]  Grand daughter is Becky    1. DM 2003 No dbr  2. Mild COAG (Tmax 25/27 ) (+fam hx) goal = 21  Cct 506/514  SLT OD 4-10-19 (24-21) + 10/4/23 (29-19)  SLT OS 6-5-19 (22-20) + 10/4/23 (26-16.5)  3. NS OU  4. Cellulitis of face     Latanoprost QHS OU  Timolol qam OU             Comments    RTC in 4 months with CCT and DFE.    Current eye drops Latanoprost QHS OU  Timolol qam OU  Denies VA changes, pain, flashes and floaters             Last edited by Gonzalez Gallego on 4/25/2025  9:59 AM.            Assessment /Plan     For exam results, see Encounter Report.    Primary open angle glaucoma (POAG) of both eyes, mild stage  Doing well, intraocular pressure (IOP) within acceptable range relative to target IOP with no evidence of progression. Continue current treatment. Reviewed importance of continued compliance with treatment and follow up.      Continue current gtts:  Latanoprost one drop in each eye nightly and Timolol one drop both eyes daily    RTC in 4 months with HVF 24-2 and IOP check.    Nuclear sclerosis of both eyes  Cortical cataract of both eyes  Cataracts are present but not visually significant. Will continue to monitor.

## 2025-06-01 DIAGNOSIS — H40.1131 PRIMARY OPEN ANGLE GLAUCOMA (POAG) OF BOTH EYES, MILD STAGE: ICD-10-CM

## 2025-06-02 RX ORDER — LATANOPROST 50 UG/ML
1 SOLUTION/ DROPS OPHTHALMIC
Qty: 9 ML | Refills: 0 | Status: SHIPPED | OUTPATIENT
Start: 2025-06-02

## 2025-08-18 ENCOUNTER — TELEPHONE (OUTPATIENT)
Dept: OPHTHALMOLOGY | Facility: CLINIC | Age: 75
End: 2025-08-18
Payer: MEDICARE

## 2025-08-22 ENCOUNTER — OFFICE VISIT (OUTPATIENT)
Dept: OPHTHALMOLOGY | Facility: CLINIC | Age: 75
End: 2025-08-22
Payer: MEDICARE

## 2025-08-22 DIAGNOSIS — H25.13 NUCLEAR SCLEROSIS OF BOTH EYES: ICD-10-CM

## 2025-08-22 DIAGNOSIS — H40.1131 PRIMARY OPEN ANGLE GLAUCOMA (POAG) OF BOTH EYES, MILD STAGE: Primary | ICD-10-CM

## 2025-08-22 DIAGNOSIS — H04.129 DRY EYE: ICD-10-CM

## 2025-08-22 DIAGNOSIS — H26.9 CORTICAL CATARACT OF BOTH EYES: ICD-10-CM

## 2025-08-22 PROCEDURE — 99999 PR PBB SHADOW E&M-EST. PATIENT-LVL III: CPT | Mod: PBBFAC,,, | Performed by: OPHTHALMOLOGY

## 2025-08-22 PROCEDURE — 99213 OFFICE O/P EST LOW 20 MIN: CPT | Mod: PBBFAC | Performed by: OPHTHALMOLOGY
